# Patient Record
Sex: FEMALE | Race: WHITE | Employment: FULL TIME | URBAN - METROPOLITAN AREA
[De-identification: names, ages, dates, MRNs, and addresses within clinical notes are randomized per-mention and may not be internally consistent; named-entity substitution may affect disease eponyms.]

---

## 2017-09-22 ENCOUNTER — OFFICE VISIT (OUTPATIENT)
Dept: FAMILY MEDICINE CLINIC | Age: 57
End: 2017-09-22

## 2017-09-22 VITALS
DIASTOLIC BLOOD PRESSURE: 68 MMHG | WEIGHT: 116 LBS | HEART RATE: 72 BPM | BODY MASS INDEX: 20.71 KG/M2 | SYSTOLIC BLOOD PRESSURE: 110 MMHG

## 2017-09-22 DIAGNOSIS — E03.9 ACQUIRED HYPOTHYROIDISM: ICD-10-CM

## 2017-09-22 DIAGNOSIS — K21.9 GASTROESOPHAGEAL REFLUX DISEASE WITHOUT ESOPHAGITIS: Primary | ICD-10-CM

## 2017-09-22 DIAGNOSIS — R09.89 GLOBUS PHARYNGEUS: ICD-10-CM

## 2017-09-22 DIAGNOSIS — E78.5 OTHER AND UNSPECIFIED HYPERLIPIDEMIA: ICD-10-CM

## 2017-09-22 DIAGNOSIS — M79.7 FIBROMYALGIA: ICD-10-CM

## 2017-09-22 LAB
CHOLESTEROL, TOTAL: 273 MG/DL (ref 0–199)
GLUCOSE BLD-MCNC: 95 MG/DL (ref 70–99)
HDLC SERPL-MCNC: 85 MG/DL (ref 40–60)
LDL CHOLESTEROL CALCULATED: 169 MG/DL
TRIGL SERPL-MCNC: 97 MG/DL (ref 0–150)
TSH SERPL DL<=0.05 MIU/L-ACNC: 1.31 UIU/ML (ref 0.27–4.2)
VLDLC SERPL CALC-MCNC: 19 MG/DL

## 2017-09-22 PROCEDURE — 99213 OFFICE O/P EST LOW 20 MIN: CPT | Performed by: FAMILY MEDICINE

## 2017-09-22 RX ORDER — LEVOTHYROXINE SODIUM 0.05 MG/1
50 TABLET ORAL DAILY
Qty: 90 TABLET | Refills: 3 | Status: SHIPPED | OUTPATIENT
Start: 2017-09-22 | End: 2018-11-04 | Stop reason: SDUPTHER

## 2017-09-22 RX ORDER — GABAPENTIN 300 MG/1
600 CAPSULE ORAL 3 TIMES DAILY
Qty: 540 CAPSULE | Refills: 3 | Status: SHIPPED | OUTPATIENT
Start: 2017-09-22 | End: 2018-09-21 | Stop reason: SDUPTHER

## 2017-09-22 RX ORDER — CLONAZEPAM 1 MG/1
1 TABLET ORAL DAILY
Qty: 5 TABLET | Refills: 0 | Status: SHIPPED | OUTPATIENT
Start: 2017-09-22 | End: 2020-01-08 | Stop reason: CLARIF

## 2018-01-02 ENCOUNTER — OFFICE VISIT (OUTPATIENT)
Dept: FAMILY MEDICINE CLINIC | Age: 58
End: 2018-01-02

## 2018-01-02 VITALS
HEART RATE: 91 BPM | DIASTOLIC BLOOD PRESSURE: 70 MMHG | BODY MASS INDEX: 21.25 KG/M2 | SYSTOLIC BLOOD PRESSURE: 122 MMHG | WEIGHT: 119 LBS

## 2018-01-02 DIAGNOSIS — Z01.811 PRE-OP CHEST EXAM: Primary | ICD-10-CM

## 2018-01-02 PROCEDURE — 3014F SCREEN MAMMO DOC REV: CPT | Performed by: FAMILY MEDICINE

## 2018-01-02 PROCEDURE — 3017F COLORECTAL CA SCREEN DOC REV: CPT | Performed by: FAMILY MEDICINE

## 2018-01-02 PROCEDURE — G8420 CALC BMI NORM PARAMETERS: HCPCS | Performed by: FAMILY MEDICINE

## 2018-01-02 PROCEDURE — 99213 OFFICE O/P EST LOW 20 MIN: CPT | Performed by: FAMILY MEDICINE

## 2018-01-02 PROCEDURE — 93000 ELECTROCARDIOGRAM COMPLETE: CPT | Performed by: FAMILY MEDICINE

## 2018-01-02 PROCEDURE — G8484 FLU IMMUNIZE NO ADMIN: HCPCS | Performed by: FAMILY MEDICINE

## 2018-01-02 PROCEDURE — G8427 DOCREV CUR MEDS BY ELIG CLIN: HCPCS | Performed by: FAMILY MEDICINE

## 2018-01-02 ASSESSMENT — PATIENT HEALTH QUESTIONNAIRE - PHQ9
1. LITTLE INTEREST OR PLEASURE IN DOING THINGS: 0
SUM OF ALL RESPONSES TO PHQ9 QUESTIONS 1 & 2: 0
SUM OF ALL RESPONSES TO PHQ QUESTIONS 1-9: 0
2. FEELING DOWN, DEPRESSED OR HOPELESS: 0

## 2018-01-02 NOTE — PROGRESS NOTES
Subjective:      Patient ID: Daria Coe is a 65 y.o. female. HPI  66 yo woman slipped and fell 10/16/17 landing on the R shoulder. Pain persisted for 2-3 weeks, sought med attn, MRI demonstrated interstitial split tear of biceps tendon as well as RC tendons with tendonosis. Allergies   Allergen Reactions    Percocet [Oxycodone-Acetaminophen] Shortness Of Breath     Itch and dyspnea    Lyrica [Pregabalin]      paresthesia    Chocolate Swelling and Other (See Comments)     Throat closes    Cymbalta [Duloxetine Hcl]      Mental fogginess    Sulfa Antibiotics Hives     Current Outpatient Prescriptions   Medication Sig Dispense Refill    clonazePAM (KLONOPIN) 1 MG tablet Take 1 tablet by mouth daily Take with supper 5 tablet 0    levothyroxine (SYNTHROID) 50 MCG tablet Take 1 tablet by mouth daily 90 tablet 3    gabapentin (NEURONTIN) 300 MG capsule Take 2 capsules by mouth 3 times daily 540 capsule 3    albuterol (PROVENTIL HFA) 108 (90 BASE) MCG/ACT inhaler Inhale 2 puffs into the lungs every 6 hours as needed for Wheezing. 1 Inhaler 0    beclomethasone (QVAR) 40 MCG/ACT inhaler Inhale 2 puffs into the lungs 2 times daily. 1 Inhaler 3    cetirizine (ZYRTEC) 10 MG tablet Take 10 mg by mouth daily.  montelukast (SINGULAIR) 10 MG tablet Take 10 mg by mouth nightly.  Mirabegron ER (MYRBETRIQ) 25 MG TB24 Take 1 tablet by mouth daily 30 tablet 5    diclofenac-misoprostol (ARTHROTEC 50) 50-0.2 MG per tablet Take 1 tablet by mouth 2 times daily as needed. 60 tablet 3     No current facility-administered medications for this visit.         Immunization History   Administered Date(s) Administered    Tdap (Boostrix, Adacel) 11/28/2016       Past Medical History:   Diagnosis Date    Asthma     Fibromyalgia     Neuropathy of both feet 2013    small fiber, saw Dr. Yandy Rivera at [place] [address] [place] (obstructive sleep apnea)     couldn't tolerate CPAP    Rheumatoid arthritis(714.0) ([facility] [address]     Spinal sounds are normal. She exhibits no abdominal bruit and no mass. There is no hepatosplenomegaly. Musculoskeletal: She exhibits no edema. Right shoulder: She exhibits decreased range of motion. Tenderness: pain with IR, forward flexion. Joints stable and strong   Lymphadenopathy:     She has no cervical adenopathy. Neurological: She is alert and oriented to person, place, and time. She has normal reflexes. No cranial nerve deficit. She displays a negative Romberg sign. Skin: Skin is warm. No rash noted. Psychiatric: She has a normal mood and affect.  Her behavior is normal.     EKG - short [place] interval and TW abnormalities    Addendum as of January 5: Patient had a GXT with [facility] yesterday and there was no evidence of ischemia  Assessment:    rotator cuff tendonosis  Bicipital tendon interstitial tear   OK for surgery   Plan:      Proceed with shoulder surgery

## 2018-01-03 ENCOUNTER — TELEPHONE (OUTPATIENT)
Dept: FAMILY MEDICINE CLINIC | Age: 58
End: 2018-01-03

## 2018-01-03 ENCOUNTER — TELEPHONE (OUTPATIENT)
Dept: CARDIOLOGY CLINIC | Age: 58
End: 2018-01-03

## 2018-01-03 DIAGNOSIS — R94.31 EKG ABNORMALITIES: Primary | ICD-10-CM

## 2018-01-04 NOTE — TELEPHONE ENCOUNTER
Pt informed. I called [facility] and scheduled Myoview for tomorrow at 10am.   Pt informed of test date. Advised pt that we will call her with results when we get them.

## 2018-01-04 NOTE — TELEPHONE ENCOUNTER
Dr. Yonatan Sykes did interpret pt EKG and is in media tab. Called Dr. [place] office and spoke to [facility] and notified her per Dr. Cherry Chaves recommendation for pt to get a Nuclear stress test, she verbalized understanding.

## 2018-01-04 NOTE — TELEPHONE ENCOUNTER
Jamison Olvera with Dr. Christina Finely office calling, Dr. Sania Hung interpreted the EKG and is suggesting nuclear stress test.

## 2018-01-05 ENCOUNTER — HOSPITAL ENCOUNTER (OUTPATIENT)
Dept: NON INVASIVE DIAGNOSTICS | Age: 58
Discharge: HOME | End: 2018-01-05
Attending: FAMILY MEDICINE | Admitting: FAMILY MEDICINE

## 2018-01-05 DIAGNOSIS — R94.31 ABNORMAL ELECTROCARDIOGRAM: ICD-10-CM

## 2018-01-05 DIAGNOSIS — R94.31 ABNORMAL EKG: ICD-10-CM

## 2018-01-05 LAB
LV EF: 77 %
LVEF MODALITY: NORMAL

## 2018-01-05 RX ORDER — 0.9 % SODIUM CHLORIDE 0.9 %
10 VIAL (ML) INJECTION PRN
Status: DISCONTINUED | OUTPATIENT
Start: 2018-01-05 | End: 2018-01-06 | Stop reason: HOSPADM

## 2018-01-05 RX ADMIN — Medication 10 ML: at 11:49

## 2018-01-05 RX ADMIN — Medication 10 ML: at 09:57

## 2018-09-22 RX ORDER — GABAPENTIN 300 MG/1
600 CAPSULE ORAL 3 TIMES DAILY
Qty: 540 CAPSULE | Refills: 1 | Status: SHIPPED | OUTPATIENT
Start: 2018-09-22 | End: 2019-01-11 | Stop reason: SDUPTHER

## 2018-11-04 DIAGNOSIS — E03.9 ACQUIRED HYPOTHYROIDISM: Primary | ICD-10-CM

## 2018-11-05 RX ORDER — LEVOTHYROXINE SODIUM 0.05 MG/1
50 TABLET ORAL DAILY
Qty: 90 TABLET | Refills: 0 | Status: SHIPPED | OUTPATIENT
Start: 2018-11-05 | End: 2019-03-05 | Stop reason: SDUPTHER

## 2018-11-15 RX ORDER — LEVOTHYROXINE SODIUM 0.05 MG/1
50 TABLET ORAL DAILY
Qty: 90 TABLET | OUTPATIENT
Start: 2018-11-15

## 2019-01-11 ENCOUNTER — OFFICE VISIT (OUTPATIENT)
Dept: FAMILY MEDICINE CLINIC | Age: 59
End: 2019-01-11
Payer: COMMERCIAL

## 2019-01-11 VITALS
WEIGHT: 119 LBS | RESPIRATION RATE: 15 BRPM | SYSTOLIC BLOOD PRESSURE: 118 MMHG | BODY MASS INDEX: 21.25 KG/M2 | HEART RATE: 83 BPM | DIASTOLIC BLOOD PRESSURE: 80 MMHG | OXYGEN SATURATION: 97 %

## 2019-01-11 DIAGNOSIS — E03.9 ACQUIRED HYPOTHYROIDISM: Primary | ICD-10-CM

## 2019-01-11 DIAGNOSIS — E03.9 ACQUIRED HYPOTHYROIDISM: ICD-10-CM

## 2019-01-11 DIAGNOSIS — G62.89 OTHER POLYNEUROPATHY: ICD-10-CM

## 2019-01-11 DIAGNOSIS — M79.7 FIBROMYALGIA: ICD-10-CM

## 2019-01-11 DIAGNOSIS — E78.00 HYPERCHOLESTEREMIA: ICD-10-CM

## 2019-01-11 LAB
CHOLESTEROL, FASTING: 238 MG/DL (ref 0–199)
GLUCOSE FASTING: 90 MG/DL (ref 70–99)
HDLC SERPL-MCNC: 78 MG/DL (ref 40–60)
LDL CHOLESTEROL CALCULATED: 145 MG/DL
THYROID PEROXIDASE (TPO) ABS: 11 IU/ML
TRIGLYCERIDE, FASTING: 74 MG/DL (ref 0–150)
TSH SERPL DL<=0.05 MIU/L-ACNC: 0.58 UIU/ML (ref 0.27–4.2)
VLDLC SERPL CALC-MCNC: 15 MG/DL

## 2019-01-11 PROCEDURE — 99214 OFFICE O/P EST MOD 30 MIN: CPT | Performed by: FAMILY MEDICINE

## 2019-01-11 PROCEDURE — 36415 COLL VENOUS BLD VENIPUNCTURE: CPT | Performed by: FAMILY MEDICINE

## 2019-01-11 RX ORDER — GABAPENTIN 300 MG/1
600 CAPSULE ORAL 3 TIMES DAILY
Qty: 540 CAPSULE | Refills: 3 | Status: SHIPPED | OUTPATIENT
Start: 2019-01-11 | End: 2020-01-08 | Stop reason: SDUPTHER

## 2019-01-11 ASSESSMENT — PATIENT HEALTH QUESTIONNAIRE - PHQ9
SUM OF ALL RESPONSES TO PHQ QUESTIONS 1-9: 0
1. LITTLE INTEREST OR PLEASURE IN DOING THINGS: 0
SUM OF ALL RESPONSES TO PHQ9 QUESTIONS 1 & 2: 0
SUM OF ALL RESPONSES TO PHQ QUESTIONS 1-9: 0
2. FEELING DOWN, DEPRESSED OR HOPELESS: 0

## 2019-12-19 ENCOUNTER — TELEPHONE (OUTPATIENT)
Dept: PRIMARY CARE CLINIC | Age: 59
End: 2019-12-19

## 2019-12-19 DIAGNOSIS — E03.9 ACQUIRED HYPOTHYROIDISM: ICD-10-CM

## 2019-12-19 DIAGNOSIS — E78.00 HYPERCHOLESTEREMIA: Primary | ICD-10-CM

## 2020-01-06 DIAGNOSIS — E78.00 HYPERCHOLESTEREMIA: ICD-10-CM

## 2020-01-06 DIAGNOSIS — E03.9 ACQUIRED HYPOTHYROIDISM: ICD-10-CM

## 2020-01-06 LAB
CHOLESTEROL, TOTAL: 252 MG/DL (ref 0–199)
HDLC SERPL-MCNC: 66 MG/DL (ref 40–60)
LDL CHOLESTEROL CALCULATED: 167 MG/DL
T4 FREE: 1.4 NG/DL (ref 0.9–1.8)
TRIGL SERPL-MCNC: 95 MG/DL (ref 0–150)
TSH SERPL DL<=0.05 MIU/L-ACNC: 1.41 UIU/ML (ref 0.27–4.2)
VLDLC SERPL CALC-MCNC: 19 MG/DL

## 2020-01-08 ENCOUNTER — OFFICE VISIT (OUTPATIENT)
Dept: PRIMARY CARE CLINIC | Age: 60
End: 2020-01-08
Payer: COMMERCIAL

## 2020-01-08 VITALS
RESPIRATION RATE: 16 BRPM | WEIGHT: 121 LBS | SYSTOLIC BLOOD PRESSURE: 102 MMHG | TEMPERATURE: 98.1 F | BODY MASS INDEX: 21.44 KG/M2 | OXYGEN SATURATION: 99 % | HEIGHT: 63 IN | DIASTOLIC BLOOD PRESSURE: 68 MMHG | HEART RATE: 88 BPM

## 2020-01-08 LAB — S PYO AG THROAT QL: NORMAL

## 2020-01-08 PROCEDURE — 87880 STREP A ASSAY W/OPTIC: CPT | Performed by: FAMILY MEDICINE

## 2020-01-08 PROCEDURE — 99396 PREV VISIT EST AGE 40-64: CPT | Performed by: FAMILY MEDICINE

## 2020-01-08 RX ORDER — LEVOTHYROXINE SODIUM 0.05 MG/1
50 TABLET ORAL DAILY
Qty: 90 TABLET | Refills: 3 | Status: SHIPPED | OUTPATIENT
Start: 2020-01-08 | End: 2021-01-05

## 2020-01-08 RX ORDER — GABAPENTIN 300 MG/1
600 CAPSULE ORAL 3 TIMES DAILY
Qty: 540 CAPSULE | Refills: 3 | Status: SHIPPED | OUTPATIENT
Start: 2020-01-08 | End: 2021-01-05

## 2020-01-08 ASSESSMENT — PATIENT HEALTH QUESTIONNAIRE - PHQ9
2. FEELING DOWN, DEPRESSED OR HOPELESS: 0
SUM OF ALL RESPONSES TO PHQ QUESTIONS 1-9: 0
SUM OF ALL RESPONSES TO PHQ QUESTIONS 1-9: 0
SUM OF ALL RESPONSES TO PHQ9 QUESTIONS 1 & 2: 0
1. LITTLE INTEREST OR PLEASURE IN DOING THINGS: 0

## 2020-01-08 ASSESSMENT — ENCOUNTER SYMPTOMS: ABDOMINAL PAIN: 1

## 2020-01-09 NOTE — PROGRESS NOTES
needed. 60 tablet 3     No current facility-administered medications for this visit. Immunization History   Administered Date(s) Administered    Tdap (Boostrix, Adacel) 11/28/2016       Past Medical History:   Diagnosis Date    Asthma     Fibromyalgia     Neuropathy of both feet 2013    small fiber, saw Dr. Sloan Andersen at [facility] [address] [place] (obstructive sleep apnea)     couldn't tolerate CPAP    Rheumatoid arthritis(714.0)     Spinal stenosis, lumbar     PATTY twice in 2011     Past Surgical History:   Procedure Laterality Date    BREAST BIOPSY Left     HYSTERECTOMY, VAGINAL  1988    bladder repair too    NASAL SINUS SURGERY Bilateral 01/2016    blloon plasty    SHOULDER SURGERY Right Jan '18    biceps tendon repair, removed end of clavicle     Family History   Problem Relation Age of Onset    High Cholesterol Mother     Cancer Mother         breast           Objective:   Physical Exam  Constitutional:       Appearance: She is well-developed. HENT:      Head: Normocephalic. Right Ear: Tympanic membrane, ear canal and external ear normal.      Left Ear: Tympanic membrane, ear canal and external ear normal.      Nose: Nose normal. No mucosal edema. Mouth/Throat:      Dentition: Normal dentition. Pharynx: Uvula midline. Pharyngeal swelling and posterior oropharyngeal erythema present. No oropharyngeal exudate. Eyes:      Conjunctiva/sclera: Conjunctivae normal.      Pupils: Pupils are equal, round, and reactive to light. Neck:      Musculoskeletal: Normal range of motion and neck supple. Thyroid: No thyromegaly. Vascular: No carotid bruit. Cardiovascular:      Rate and Rhythm: Normal rate and regular rhythm. Heart sounds: Normal heart sounds. No murmur. Pulmonary:      Effort: Pulmonary effort is normal.      Breath sounds: Normal breath sounds. Abdominal:      General: Bowel sounds are normal. There is no abdominal bruit. Palpations: Abdomen is soft. There is no mass. Tenderness: There is tenderness in the right upper quadrant. Negative signs include Pederson's sign. Musculoskeletal: Normal range of motion. Cervical back: She exhibits tenderness (paired trigger pts in neck mm). Comments: Joints stable and strong   Lymphadenopathy:      Cervical: Cervical adenopathy present. Right cervical: Superficial cervical adenopathy present. Skin:     General: Skin is warm. Findings: No rash. Neurological:      Mental Status: She is alert and oriented to person, place, and time. Cranial Nerves: No cranial nerve deficit. Deep Tendon Reflexes: Reflexes are normal and symmetric.    Psychiatric:         Behavior: Behavior normal.       Vitals:    01/08/20 1906   BP: 102/68   Pulse: 88   Resp: 16   Temp: 98.1 °F (36.7 °C)   SpO2: 99%     Rapid strep negative    Assessment:    Well woman  Viral pharyngitis  Right upper quadrant pain possible gallbladder disease  Fibromyalgia  Bilateral knee pain of uncertain cause rule out autoimmune      Plan:    Lab work  Refill Synthroid and gabapentin          Kirsten Ayala MD

## 2020-01-30 DIAGNOSIS — E55.9 VITAMIN D DEFICIENCY: ICD-10-CM

## 2020-01-30 DIAGNOSIS — M79.18 MYALGIA OF MUSCLE OF NECK: ICD-10-CM

## 2020-01-30 DIAGNOSIS — M17.0 ARTHRITIS OF BOTH KNEES: ICD-10-CM

## 2020-01-30 LAB
BASOPHILS ABSOLUTE: 0.1 K/UL (ref 0–0.2)
BASOPHILS RELATIVE PERCENT: 1.3 %
C-REACTIVE PROTEIN: 0.8 MG/L (ref 0–5.1)
EOSINOPHILS ABSOLUTE: 0.2 K/UL (ref 0–0.6)
EOSINOPHILS RELATIVE PERCENT: 4.2 %
HCT VFR BLD CALC: 43.7 % (ref 36–48)
HEMOGLOBIN: 15.2 G/DL (ref 12–16)
LYMPHOCYTES ABSOLUTE: 1.7 K/UL (ref 1–5.1)
LYMPHOCYTES RELATIVE PERCENT: 28.5 %
MCH RBC QN AUTO: 32.4 PG (ref 26–34)
MCHC RBC AUTO-ENTMCNC: 34.7 G/DL (ref 31–36)
MCV RBC AUTO: 93.5 FL (ref 80–100)
MONOCYTES ABSOLUTE: 0.4 K/UL (ref 0–1.3)
MONOCYTES RELATIVE PERCENT: 6.1 %
NEUTROPHILS ABSOLUTE: 3.5 K/UL (ref 1.7–7.7)
NEUTROPHILS RELATIVE PERCENT: 59.9 %
PDW BLD-RTO: 13.1 % (ref 12.4–15.4)
PLATELET # BLD: 237 K/UL (ref 135–450)
PMV BLD AUTO: 8.8 FL (ref 5–10.5)
RBC # BLD: 4.68 M/UL (ref 4–5.2)
SEDIMENTATION RATE, ERYTHROCYTE: 7 MM/HR (ref 0–30)
URIC ACID, SERUM: 3.4 MG/DL (ref 2.6–6)
VITAMIN D 25-HYDROXY: 32.3 NG/ML
WBC # BLD: 5.8 K/UL (ref 4–11)

## 2020-01-31 LAB — ANTI-NUCLEAR ANTIBODY (ANA): NEGATIVE

## 2021-01-03 DIAGNOSIS — E03.9 ACQUIRED HYPOTHYROIDISM: Primary | ICD-10-CM

## 2021-01-04 NOTE — TELEPHONE ENCOUNTER
Medication:   Requested Prescriptions     Pending Prescriptions Disp Refills    levothyroxine (SYNTHROID) 50 MCG tablet [Pharmacy Med Name: LEVOTHYROXINE  50MCG  TAB] 90 tablet 3     Sig: TAKE 1 TABLET BY MOUTH  DAILY    gabapentin (NEURONTIN) 300 MG capsule [Pharmacy Med Name: GABAPENTIN 300MG CAPSULE] 540 capsule 3     Sig: TAKE 2 CAPSULES BY MOUTH 3  TIMES DAILY     Last Filled: 1.8.20   Last appt: 1/8/2020   Next appt: Visit date not found    Last Thyroid:   Lab Results   Component Value Date    TSH 1.41 01/06/2020    T4FREE 1.4 01/06/2020

## 2021-01-05 RX ORDER — GABAPENTIN 300 MG/1
CAPSULE ORAL
Qty: 540 CAPSULE | Refills: 0 | Status: SHIPPED | OUTPATIENT
Start: 2021-01-05 | End: 2021-03-27

## 2021-01-05 RX ORDER — LEVOTHYROXINE SODIUM 0.05 MG/1
50 TABLET ORAL DAILY
Qty: 90 TABLET | Refills: 0 | Status: SHIPPED | OUTPATIENT
Start: 2021-01-05 | End: 2021-03-27

## 2021-01-12 DIAGNOSIS — E03.9 ACQUIRED HYPOTHYROIDISM: Primary | ICD-10-CM

## 2021-01-12 DIAGNOSIS — E78.00 HYPERCHOLESTEREMIA: ICD-10-CM

## 2021-01-19 ENCOUNTER — OFFICE VISIT (OUTPATIENT)
Dept: PRIMARY CARE CLINIC | Age: 61
End: 2021-01-19
Payer: COMMERCIAL

## 2021-01-19 VITALS
DIASTOLIC BLOOD PRESSURE: 66 MMHG | SYSTOLIC BLOOD PRESSURE: 115 MMHG | HEART RATE: 70 BPM | WEIGHT: 123 LBS | TEMPERATURE: 97.8 F | BODY MASS INDEX: 21.96 KG/M2

## 2021-01-19 DIAGNOSIS — S05.91XA RIGHT EYE INJURY, INITIAL ENCOUNTER: Primary | ICD-10-CM

## 2021-01-19 DIAGNOSIS — R68.84 MANDIBULAR PAIN: ICD-10-CM

## 2021-01-19 DIAGNOSIS — E78.00 HYPERCHOLESTEREMIA: ICD-10-CM

## 2021-01-19 DIAGNOSIS — E03.9 ACQUIRED HYPOTHYROIDISM: ICD-10-CM

## 2021-01-19 LAB
CHOLESTEROL, TOTAL: 252 MG/DL (ref 0–199)
HDLC SERPL-MCNC: 77 MG/DL (ref 40–60)
LDL CHOLESTEROL CALCULATED: 161 MG/DL
TRIGL SERPL-MCNC: 72 MG/DL (ref 0–150)
TSH SERPL DL<=0.05 MIU/L-ACNC: 0.86 UIU/ML (ref 0.27–4.2)
VLDLC SERPL CALC-MCNC: 14 MG/DL

## 2021-01-19 PROCEDURE — 99213 OFFICE O/P EST LOW 20 MIN: CPT | Performed by: FAMILY MEDICINE

## 2021-01-19 SDOH — ECONOMIC STABILITY: TRANSPORTATION INSECURITY
IN THE PAST 12 MONTHS, HAS LACK OF TRANSPORTATION KEPT YOU FROM MEETINGS, WORK, OR FROM GETTING THINGS NEEDED FOR DAILY LIVING?: NOT ASKED

## 2021-01-19 SDOH — ECONOMIC STABILITY: INCOME INSECURITY: HOW HARD IS IT FOR YOU TO PAY FOR THE VERY BASICS LIKE FOOD, HOUSING, MEDICAL CARE, AND HEATING?: NOT HARD AT ALL

## 2021-01-19 ASSESSMENT — PATIENT HEALTH QUESTIONNAIRE - PHQ9: 1. LITTLE INTEREST OR PLEASURE IN DOING THINGS: 0

## 2021-01-26 ENCOUNTER — TELEPHONE (OUTPATIENT)
Dept: PRIMARY CARE CLINIC | Age: 61
End: 2021-01-26

## 2021-01-26 NOTE — TELEPHONE ENCOUNTER
Pt called back and stated that her eye is doing better and pt doesn't need a call back. It was an abrasion and pt is doing a lot better with medicine that RAJESH gave her.

## 2021-03-26 NOTE — TELEPHONE ENCOUNTER
Medication:   Requested Prescriptions     Pending Prescriptions Disp Refills    gabapentin (NEURONTIN) 300 MG capsule [Pharmacy Med Name: GABAPENTIN 300MG CAPSULE] 540 capsule 3     Sig: TAKE 2 CAPSULES BY MOUTH 3  TIMES DAILY    levothyroxine (SYNTHROID) 50 MCG tablet [Pharmacy Med Name: LEVOTHYROXINE  50MCG  TAB] 90 tablet 3     Sig: TAKE 1 TABLET BY MOUTH  DAILY       Last Filled:      Patient Phone Number: [phone] (home)     Last appt: 1/19/2021   Next appt: Visit date not found    Last Thyroid:   Lab Results   Component Value Date    TSH 0.86 01/19/2021    T4FREE 1.4 01/06/2020       Last OARRS: No flowsheet data found. Preferred Pharmacy:   [address] [facility] [phone] - F [phone]  [place] [address]  [address]  [facility] [address]  Phone: [phone] Fax: [address] [facility] [address] [address] [facility]  [address]  [facility] [address] [facility]  Phone: [phone] Fax: [phone]    [facility] [address] [place] [address] [phone] [place] [phone]  [facility] [address]  Phone: [phone] Fax: [phone]

## 2021-03-27 RX ORDER — LEVOTHYROXINE SODIUM 0.05 MG/1
50 TABLET ORAL DAILY
Qty: 90 TABLET | Refills: 3 | Status: SHIPPED | OUTPATIENT
Start: 2021-03-27 | End: 2022-02-23

## 2021-03-27 RX ORDER — GABAPENTIN 300 MG/1
CAPSULE ORAL
Qty: 540 CAPSULE | Refills: 3 | Status: SHIPPED | OUTPATIENT
Start: 2021-03-27 | End: 2022-02-23

## 2021-10-18 ENCOUNTER — NURSE ONLY (OUTPATIENT)
Dept: PRIMARY CARE CLINIC | Age: 61
End: 2021-10-18

## 2021-10-18 ENCOUNTER — OFFICE VISIT (OUTPATIENT)
Dept: PRIMARY CARE CLINIC | Age: 61
End: 2021-10-18
Payer: COMMERCIAL

## 2021-10-18 VITALS
TEMPERATURE: 98.4 F | WEIGHT: 121.4 LBS | DIASTOLIC BLOOD PRESSURE: 68 MMHG | OXYGEN SATURATION: 98 % | SYSTOLIC BLOOD PRESSURE: 116 MMHG | BODY MASS INDEX: 21.51 KG/M2 | HEART RATE: 83 BPM | HEIGHT: 63 IN

## 2021-10-18 DIAGNOSIS — R94.31 ABNORMAL EKG: ICD-10-CM

## 2021-10-18 DIAGNOSIS — Z01.818 PREOP EXAMINATION: Primary | ICD-10-CM

## 2021-10-18 DIAGNOSIS — Z01.818 PREOP EXAMINATION: ICD-10-CM

## 2021-10-18 LAB
APTT: 36.1 SEC (ref 26.2–38.6)
INR BLD: 0.97 (ref 0.88–1.12)
PROTHROMBIN TIME: 10.9 SEC (ref 9.9–12.7)

## 2021-10-18 PROCEDURE — 93000 ELECTROCARDIOGRAM COMPLETE: CPT | Performed by: STUDENT IN AN ORGANIZED HEALTH CARE EDUCATION/TRAINING PROGRAM

## 2021-10-18 PROCEDURE — 99212 OFFICE O/P EST SF 10 MIN: CPT | Performed by: STUDENT IN AN ORGANIZED HEALTH CARE EDUCATION/TRAINING PROGRAM

## 2021-10-18 NOTE — PATIENT INSTRUCTIONS
Gregory LIMA[address] [facility]    COVID preop testing   .   Call Please call [phone] to schedule stress test

## 2021-10-18 NOTE — PROGRESS NOTES
Preoperative Consultation      Gail Blank  YOB: 1960    Date of Service:  10/18/2021    Vitals:    10/18/21 1449   BP: 116/68   Site: Right Upper Arm   Position: Sitting   Cuff Size: Medium Adult   Pulse: 83   Temp: 98.4 °F (36.9 °C)   TempSrc: Oral   SpO2: 98%   Weight: 121 lb 6.4 oz (55.1 kg)   Height: 5' 2.75\" (1.594 m)      Wt Readings from Last 2 Encounters:   10/18/21 121 lb 6.4 oz (55.1 kg)   01/19/21 123 lb (55.8 kg)     BP Readings from Last 3 Encounters:   10/18/21 116/68   01/19/21 115/66   01/08/20 102/68        Chief Complaint   Patient presents with    Pre-op Exam     11.4.21, Dr. Parminder Weaver, ph. [phone] f- [phone], [facility]     Allergies   Allergen Reactions    Percocet [Oxycodone-Acetaminophen] Shortness Of Breath     Itch and dyspnea    Lyrica [Pregabalin]      paresthesia    Chocolate Swelling and Other (See Comments)     Throat closes    Cymbalta [Duloxetine Hcl]      Mental fogginess    Sulfa Antibiotics Hives     Outpatient Medications Marked as Taking for the 10/18/21 encounter (Office Visit) with Seema Samuels MD   Medication Sig Dispense Refill    levothyroxine (SYNTHROID) 50 MCG tablet TAKE 1 TABLET BY MOUTH  DAILY 90 tablet 3    albuterol (PROVENTIL HFA) 108 (90 BASE) MCG/ACT inhaler Inhale 2 puffs into the lungs every 6 hours as needed for Wheezing. 1 Inhaler 0    beclomethasone (QVAR) 40 MCG/ACT inhaler Inhale 2 puffs into the lungs 2 times daily. 1 Inhaler 3    cetirizine (ZYRTEC) 10 MG tablet Take 10 mg by mouth daily.  montelukast (SINGULAIR) 10 MG tablet Take 10 mg by mouth nightly.          Cloyce Copping  Well controlled  Not shortness of breath    Hypothyroid  Stable  Asymptomatic    [place]  Does not wear CPAP, denies SHORTNESS OF BREATH, palpitations, chest pain or fatigue  This patient presents to the office today for a preoperative consultation at the request of surgeon, Dr. Maile Donis, who plans on performing L5-S1 anterior/posterior lumbar fusion on November 4 at [facility].  The current problem began 7 months ago, and symptoms have been unchanged with time. Conservative therapy: physical therapy, pain meds, and epidurals with no relief in sx's .     Planned anesthesia: General   Known anesthesia problems: None   Bleeding risk: No recent or remote history of abnormal bleeding  Personal or FH of DVT/PE: No    Patient objection to receiving blood products: No    Patient Active Problem List   Diagnosis    Hypothyroid    Fibromyalgia    Irritable bowel syndrome    Biceps tendonitis    Lumbar radicular pain    Asthma    Hypercholesteremia    [place] (obstructive sleep apnea)       Past Medical History:   Diagnosis Date    Asthma     Fibromyalgia     Neuropathy of both feet 2013    small fiber, saw Dr. Amarjit Canales at [facility] [address] [place] (obstructive sleep apnea)     couldn't tolerate CPAP    Rheumatoid arthritis(714.0)     Spinal stenosis, lumbar     PATTY twice in 2011     Past Surgical History:   Procedure Laterality Date    BREAST BIOPSY Left     HYSTERECTOMY, VAGINAL  1988    bladder repair too    NASAL SINUS SURGERY Bilateral 01/2016    blloon plasty    SHOULDER SURGERY Right Jan '18    biceps tendon repair, removed end of clavicle     Family History   Problem Relation Age of Onset    High Cholesterol Mother     Cancer Mother         breast     Social History     Socioeconomic History    Marital status:      Spouse name: Spring Hearn Number of children: 3    Years of education: Not on file    Highest education level: Not on file   Occupational History    Occupation: RN     Comment: [employer]   Tobacco Use    Smoking status: Never Smoker    Smokeless tobacco: Never Used   Substance and Sexual Activity    Alcohol use: No     Comment: very rare    Drug use: No    Sexual activity: Yes     Partners: Male   Other Topics Concern    Not on file   Social History Narrative    2 grandchildren    Leslie and stairs for exercise     Social Determinants of Health     Financial Resource Strain: Low Risk     Difficulty of Paying Living Expenses: Not hard at all   Food Insecurity: No Food Insecurity    Worried About Running Out of Food in the Last Year: Never true    Terra of Food in the Last Year: Never true   Transportation Needs:     Lack of Transportation (Medical):  Lack of Transportation (Non-Medical):    Physical Activity:     Days of Exercise per Week:     Minutes of Exercise per Session:    Stress:     Feeling of Stress :    Social Connections:     Frequency of Communication with Friends and Family:     Frequency of Social Gatherings with Friends and Family:     Attends Restoration Services:     Active Member of Clubs or Organizations:     Attends Club or Organization Meetings:     Marital Status:    Intimate Partner Violence:     Fear of Current or Ex-Partner:     Emotionally Abused:     Physically Abused:     Sexually Abused:        Review of Systems  A comprehensive review of systems was negative except for what was noted in the HPI. Physical Exam   Constitutional: She is oriented to person, place, and time. She appears well-developed and well-nourished. No distress. HENT:   Head: Normocephalic and atraumatic. Mouth/Throat: Uvula is midline, oropharynx is clear and moist and mucous membranes are normal.   Eyes: Conjunctivae and EOM are normal. Pupils are equal, round, and reactive to light. Neck: Trachea normal and normal range of motion. Neck supple. No JVD present. Carotid bruit is not present. No mass and no thyromegaly present. Cardiovascular: Normal rate, regular rhythm, normal heart sounds and intact distal pulses. Exam reveals no gallop and no friction rub. No murmur heard. Pulmonary/Chest: Effort normal and breath sounds normal. No respiratory distress. She has no wheezes. She has no rales. Abdominal: Soft. Normal aorta and bowel sounds are normal. She exhibits no distension and no mass.  There is no hepatosplenomegaly. No tenderness. Musculoskeletal: She exhibits no edema and no tenderness. Neurological: She is alert and oriented to person, place, and time. She has normal strength. No cranial nerve deficit or sensory deficit. Coordination and gait normal.   Skin: Skin is warm and dry. No rash noted. No erythema. Psychiatric: She has a normal mood and affect. Her behavior is normal.     EKG Interpretation:  ST depression in V2-V4 change from EKG in 2018    Lab Review not applicable        Assessment:       62 y.o. patient with planned surgery as above. Known risk factors for perioperative complications: Asthma  Current medications which may produce withdrawal symptoms if withheld perioperatively: none      Plan:     1. Preoperative workup as follows: cardiac stress testing to exclude undiagnosed coronary disease (EKG today showed inverted T waves in leads V2, V3, V4, V5 which is a change from EKG done back in 2018) patient is asymptomatic. 2. Change in medication regimen before surgery: Discontinue ASA 7 days before surgery, Discontinue NSAIDs (motrin/aleve) 7 days before surgery  3. Prophylaxis for cardiac events with perioperative beta-blockers: Not indicated  ACC/AHA indications for pre-operative beta-blocker use:    · Vascular surgery with history of postitive stress test  · Intermediate or high risk surgery with history of CAD   · Intermediate or high risk surgery with multiple clinical predictors of CAD- 2 of the following: history of compensated or prior heart failure, history of cerebrovascular disease, DM, or renal insufficiency    Routine administration of higher-dose, long-acting metoprolol in beta-blocker-naïve patients on the day of surgery, and in the absence of dose titration is associated with an overall increase in mortality.   Beta-blockers should be started days to weeks prior to surgery and titrated to pulse < 70.  4. Deep vein thrombosis prophylaxis: regimen to be chosen by surgical team  5. Will await stress testing results before can clear for surgery.     Update on 10/26/21, stress test normal, OK FOR SURGERY

## 2021-10-19 LAB
ANION GAP SERPL CALCULATED.3IONS-SCNC: 14 MMOL/L (ref 3–16)
BASOPHILS ABSOLUTE: 0.1 K/UL (ref 0–0.2)
BASOPHILS RELATIVE PERCENT: 0.9 %
BILIRUBIN URINE: NEGATIVE
BLOOD, URINE: NEGATIVE
BUN BLDV-MCNC: 18 MG/DL (ref 7–20)
CALCIUM SERPL-MCNC: 10 MG/DL (ref 8.3–10.6)
CHLORIDE BLD-SCNC: 102 MMOL/L (ref 99–110)
CLARITY: CLEAR
CO2: 26 MMOL/L (ref 21–32)
COLOR: YELLOW
CREAT SERPL-MCNC: 0.9 MG/DL (ref 0.6–1.2)
EOSINOPHILS ABSOLUTE: 0.1 K/UL (ref 0–0.6)
EOSINOPHILS RELATIVE PERCENT: 1.2 %
GFR AFRICAN AMERICAN: >60
GFR NON-AFRICAN AMERICAN: >60
GLUCOSE BLD-MCNC: 97 MG/DL (ref 70–99)
GLUCOSE URINE: NEGATIVE MG/DL
HCT VFR BLD CALC: 45.4 % (ref 36–48)
HEMOGLOBIN: 15.8 G/DL (ref 12–16)
KETONES, URINE: NEGATIVE MG/DL
LEUKOCYTE ESTERASE, URINE: NEGATIVE
LYMPHOCYTES ABSOLUTE: 1.8 K/UL (ref 1–5.1)
LYMPHOCYTES RELATIVE PERCENT: 21 %
MCH RBC QN AUTO: 32.6 PG (ref 26–34)
MCHC RBC AUTO-ENTMCNC: 34.9 G/DL (ref 31–36)
MCV RBC AUTO: 93.4 FL (ref 80–100)
MICROSCOPIC EXAMINATION: NORMAL
MONOCYTES ABSOLUTE: 0.6 K/UL (ref 0–1.3)
MONOCYTES RELATIVE PERCENT: 6.8 %
NEUTROPHILS ABSOLUTE: 5.9 K/UL (ref 1.7–7.7)
NEUTROPHILS RELATIVE PERCENT: 70.1 %
NITRITE, URINE: NEGATIVE
PDW BLD-RTO: 12.8 % (ref 12.4–15.4)
PH UA: 6.5 (ref 5–8)
PLATELET # BLD: 266 K/UL (ref 135–450)
PMV BLD AUTO: 9 FL (ref 5–10.5)
POTASSIUM SERPL-SCNC: 4.2 MMOL/L (ref 3.5–5.1)
PROTEIN UA: NEGATIVE MG/DL
RBC # BLD: 4.86 M/UL (ref 4–5.2)
SODIUM BLD-SCNC: 142 MMOL/L (ref 136–145)
SPECIFIC GRAVITY UA: 1.01 (ref 1–1.03)
URINE CULTURE, ROUTINE: NORMAL
URINE TYPE: NORMAL
UROBILINOGEN, URINE: 0.2 E.U./DL
WBC # BLD: 8.4 K/UL (ref 4–11)

## 2021-10-20 LAB — MRSA CULTURE ONLY: NORMAL

## 2021-10-25 ENCOUNTER — HOSPITAL ENCOUNTER (OUTPATIENT)
Dept: NON INVASIVE DIAGNOSTICS | Age: 61
Discharge: HOME | End: 2021-10-25
Payer: COMMERCIAL

## 2021-10-25 DIAGNOSIS — Z01.818 PREOP EXAMINATION: ICD-10-CM

## 2021-10-25 DIAGNOSIS — R94.31 ABNORMAL EKG: ICD-10-CM

## 2021-10-25 LAB
LV EF: 79 %
LVEF MODALITY: NORMAL

## 2021-10-25 PROCEDURE — 93017 CV STRESS TEST TRACING ONLY: CPT

## 2021-10-25 PROCEDURE — 6360000002 HC RX W HCPCS: Performed by: INTERNAL MEDICINE

## 2021-10-25 PROCEDURE — 3430000000 HC RX DIAGNOSTIC RADIOPHARMACEUTICAL: Performed by: STUDENT IN AN ORGANIZED HEALTH CARE EDUCATION/TRAINING PROGRAM

## 2021-10-25 PROCEDURE — A9502 TC99M TETROFOSMIN: HCPCS | Performed by: STUDENT IN AN ORGANIZED HEALTH CARE EDUCATION/TRAINING PROGRAM

## 2021-10-25 PROCEDURE — 2580000003 HC RX 258: Performed by: STUDENT IN AN ORGANIZED HEALTH CARE EDUCATION/TRAINING PROGRAM

## 2021-10-25 PROCEDURE — 78452 HT MUSCLE IMAGE SPECT MULT: CPT

## 2021-10-25 RX ORDER — SODIUM CHLORIDE 0.9 % (FLUSH) 0.9 %
10 SYRINGE (ML) INJECTION PRN
Status: DISCONTINUED | OUTPATIENT
Start: 2021-10-25 | End: 2021-10-26 | Stop reason: HOSPADM

## 2021-10-25 RX ADMIN — TETROFOSMIN 30 MILLICURIE: 1.38 INJECTION, POWDER, LYOPHILIZED, FOR SOLUTION INTRAVENOUS at 09:34

## 2021-10-25 RX ADMIN — REGADENOSON 0.4 MG: 0.08 INJECTION, SOLUTION INTRAVENOUS at 09:34

## 2021-10-25 RX ADMIN — Medication 10 ML: at 09:34

## 2021-10-25 RX ADMIN — TETROFOSMIN 10 MILLICURIE: 1.38 INJECTION, POWDER, LYOPHILIZED, FOR SOLUTION INTRAVENOUS at 08:22

## 2021-10-25 RX ADMIN — Medication 10 ML: at 08:23

## 2021-10-25 NOTE — PROGRESS NOTES
Patient was scheduled for a treadmill Myoview stress test today. Per Cardiologist, Dr. Ana Saavedra, it is ok to switch patient's test to a chemical Lexiscan stress test due to patient's back pain and scheduled lumbar surgery. Order placed for [place].

## 2021-10-26 ENCOUNTER — TELEPHONE (OUTPATIENT)
Dept: PRIMARY CARE CLINIC | Age: 61
End: 2021-10-26

## 2021-10-26 NOTE — TELEPHONE ENCOUNTER
Patient was calling because she received her results for her stress test on her MyChart but they have not been reviewed yet by Dr. Denis Cobb. Patient was having surgery next week and Dr Denis Cobb will be out of the office this Thursday and Friday.       Patient wanted to see if the results can be read before Thursday

## 2021-10-27 ENCOUNTER — TELEPHONE (OUTPATIENT)
Dept: PRIMARY CARE CLINIC | Age: 61
End: 2021-10-27

## 2021-10-27 NOTE — TELEPHONE ENCOUNTER
----- Message from Rachel Pro MD sent at 10/26/2021  5:21 PM EDT -----  Normal low risk scan, patient cleared for surgery.

## 2021-10-27 NOTE — TELEPHONE ENCOUNTER
I sent this on another message, stress test was fine, she is cleared for surgery, I already addended my preop note, if needed please fax to surgical team.

## 2022-02-23 DIAGNOSIS — E03.9 ACQUIRED HYPOTHYROIDISM: Primary | ICD-10-CM

## 2022-02-23 RX ORDER — LEVOTHYROXINE SODIUM 0.05 MG/1
50 TABLET ORAL DAILY
Qty: 90 TABLET | Refills: 3 | Status: SHIPPED | OUTPATIENT
Start: 2022-02-23

## 2022-02-23 RX ORDER — GABAPENTIN 300 MG/1
CAPSULE ORAL
Qty: 540 CAPSULE | Refills: 3 | Status: SHIPPED | OUTPATIENT
Start: 2022-02-23 | End: 2022-05-24

## 2022-02-23 NOTE — TELEPHONE ENCOUNTER
Medication:   Requested Prescriptions     Pending Prescriptions Disp Refills    levothyroxine (SYNTHROID) 50 MCG tablet [Pharmacy Med Name: Levothyroxine Sodium 50 MCG Oral Tablet] 90 tablet 3     Sig: TAKE 1 TABLET BY MOUTH  DAILY    gabapentin (NEURONTIN) 300 MG capsule [Pharmacy Med Name: Gabapentin 300 MG Oral Capsule] 540 capsule 3     Sig: TAKE 2 CAPSULES BY MOUTH 3  TIMES DAILY     Last Filled:  3/27/21    Last appt: 10/18/2021   Next appt: Visit date not found    Last Labs DM: No results found for: LABA1C  Last Lipid:   Lab Results   Component Value Date    CHOL 252 01/19/2021    TRIG 72 01/19/2021    HDL 77 01/19/2021    HDL 57 05/11/2010    LDLCALC 161 01/19/2021     Last Thyroid:   Lab Results   Component Value Date    TSH 0.86 01/19/2021    T4FREE 1.4 01/06/2020

## 2022-08-10 LAB
ANION GAP SERPL CALCULATED.3IONS-SCNC: 13 MMOL/L (ref 3–16)
BUN BLDV-MCNC: 16 MG/DL (ref 7–20)
CALCIUM SERPL-MCNC: 9.3 MG/DL (ref 8.3–10.6)
CHLORIDE BLD-SCNC: 103 MMOL/L (ref 99–110)
CO2: 25 MMOL/L (ref 21–32)
CREAT SERPL-MCNC: 0.7 MG/DL (ref 0.6–1.2)
GFR AFRICAN AMERICAN: >60
GFR NON-AFRICAN AMERICAN: >60
GLUCOSE BLD-MCNC: 88 MG/DL (ref 70–99)
POTASSIUM SERPL-SCNC: 4.7 MMOL/L (ref 3.5–5.1)
SODIUM BLD-SCNC: 141 MMOL/L (ref 136–145)

## 2022-10-25 LAB — MAMMOGRAPHY, EXTERNAL: NORMAL

## 2023-01-26 NOTE — TELEPHONE ENCOUNTER
Any patient who has not been seen for over a year will always need an appointment before refills can be sent in

## 2023-01-26 NOTE — TELEPHONE ENCOUNTER
Medication:   Requested Prescriptions     Pending Prescriptions Disp Refills    levothyroxine (SYNTHROID) 50 MCG tablet [Pharmacy Med Name: Levothyroxine Sodium 50 MCG Oral Tablet] 90 tablet 3     Sig: TAKE 1 TABLET BY MOUTH  DAILY    gabapentin (NEURONTIN) 300 MG capsule [Pharmacy Med Name: Gabapentin 300 MG Oral Capsule] 540 capsule 3     Sig: TAKE 2 CAPSULES BY MOUTH 3  TIMES DAILY        Last Filled:      Patient Phone Number: [phone] (home)     Last appt: 10/18/2021   Next appt: Visit date not found    Last OARRS: No flowsheet data found. Preferred Pharmacy:   [facility] ([address] [facility]) - [address] [facility] [phone] - F [phone]  [address] [place] [address]  Phone: [phone] Fax: [phone]    [id number] [address], [address] [facility] [address] [address] [facility]  [address] [place] [address] [address] [facility]  Phone: [phone] Fax: [phone]    [facility] [address] [phone] Oscar Scott [phone]  [address] [address]  Phone: [phone] Fax: [phone]    [facility] ([facility] ) - [address] [phone] Oscar Scott [phone]  [facility] [address]  Phone: [phone] Fax: [phone]

## 2023-01-26 NOTE — TELEPHONE ENCOUNTER
Medication:   Requested Prescriptions     Pending Prescriptions Disp Refills    levothyroxine (SYNTHROID) 50 MCG tablet [Pharmacy Med Name: Levothyroxine Sodium 50 MCG Oral Tablet] 90 tablet 3     Sig: TAKE 1 TABLET BY MOUTH  DAILY    gabapentin (NEURONTIN) 300 MG capsule [Pharmacy Med Name: Gabapentin 300 MG Oral Capsule] 540 capsule 3     Sig: TAKE 2 CAPSULES BY MOUTH 3  TIMES DAILY     Last Filled:  02/23/2022    Last appt: 10/18/2021   Next appt: Visit date not found    Last OARRS: No flowsheet data found.

## 2023-01-27 RX ORDER — LEVOTHYROXINE SODIUM 0.05 MG/1
50 TABLET ORAL DAILY
Qty: 90 TABLET | Refills: 3 | OUTPATIENT
Start: 2023-01-27

## 2023-01-27 RX ORDER — GABAPENTIN 300 MG/1
CAPSULE ORAL
Qty: 540 CAPSULE | Refills: 3 | OUTPATIENT
Start: 2023-01-27 | End: 2023-04-27

## 2023-01-27 NOTE — TELEPHONE ENCOUNTER
I have not seen patient in over a year, please just make an appointment for any patient that I have not seen in over a year because I will not be able to prescribe any medications.

## 2023-01-30 ENCOUNTER — OFFICE VISIT (OUTPATIENT)
Dept: PRIMARY CARE CLINIC | Age: 63
End: 2023-01-30

## 2023-01-30 VITALS
TEMPERATURE: 96.8 F | BODY MASS INDEX: 22.16 KG/M2 | WEIGHT: 120.4 LBS | OXYGEN SATURATION: 95 % | HEIGHT: 62 IN | DIASTOLIC BLOOD PRESSURE: 68 MMHG | HEART RATE: 90 BPM | SYSTOLIC BLOOD PRESSURE: 112 MMHG

## 2023-01-30 DIAGNOSIS — Z23 NEED FOR VACCINATION FOR PNEUMOCOCCUS: ICD-10-CM

## 2023-01-30 DIAGNOSIS — G62.9 PERIPHERAL POLYNEUROPATHY: ICD-10-CM

## 2023-01-30 DIAGNOSIS — Z00.00 ENCOUNTER FOR ANNUAL PHYSICAL EXAM: Primary | ICD-10-CM

## 2023-01-30 DIAGNOSIS — G89.29 CHRONIC PAIN OF MULTIPLE JOINTS: ICD-10-CM

## 2023-01-30 DIAGNOSIS — M25.50 CHRONIC PAIN OF MULTIPLE JOINTS: ICD-10-CM

## 2023-01-30 DIAGNOSIS — Z23 NEED FOR SHINGLES VACCINE: ICD-10-CM

## 2023-01-30 LAB
ALBUMIN SERPL-MCNC: 4.4 G/DL (ref 3.4–5)
ALP BLD-CCNC: 86 U/L (ref 40–129)
ALT SERPL-CCNC: 12 U/L (ref 10–40)
ANION GAP SERPL CALCULATED.3IONS-SCNC: 12 MMOL/L (ref 3–16)
AST SERPL-CCNC: 18 U/L (ref 15–37)
BASOPHILS ABSOLUTE: 0.1 K/UL (ref 0–0.2)
BASOPHILS RELATIVE PERCENT: 0.5 %
BILIRUB SERPL-MCNC: <0.2 MG/DL (ref 0–1)
BILIRUBIN DIRECT: <0.2 MG/DL (ref 0–0.3)
BILIRUBIN, INDIRECT: NORMAL MG/DL (ref 0–1)
BUN BLDV-MCNC: 17 MG/DL (ref 7–20)
C-REACTIVE PROTEIN: <3 MG/L (ref 0–5.1)
CALCIUM SERPL-MCNC: 9.7 MG/DL (ref 8.3–10.6)
CHLORIDE BLD-SCNC: 101 MMOL/L (ref 99–110)
CHOLESTEROL, TOTAL: 276 MG/DL (ref 0–199)
CO2: 27 MMOL/L (ref 21–32)
CREAT SERPL-MCNC: 0.7 MG/DL (ref 0.6–1.2)
EOSINOPHILS ABSOLUTE: 0.1 K/UL (ref 0–0.6)
EOSINOPHILS RELATIVE PERCENT: 1.4 %
FOLATE: 9.31 NG/ML (ref 4.78–24.2)
GFR SERPL CREATININE-BSD FRML MDRD: >60 ML/MIN/{1.73_M2}
GLUCOSE BLD-MCNC: 92 MG/DL (ref 70–99)
HCT VFR BLD CALC: 42.7 % (ref 36–48)
HDLC SERPL-MCNC: 78 MG/DL (ref 40–60)
HEMOGLOBIN: 14.5 G/DL (ref 12–16)
LDL CHOLESTEROL CALCULATED: 175 MG/DL
LYMPHOCYTES ABSOLUTE: 2.3 K/UL (ref 1–5.1)
LYMPHOCYTES RELATIVE PERCENT: 21.4 %
MCH RBC QN AUTO: 31.5 PG (ref 26–34)
MCHC RBC AUTO-ENTMCNC: 34 G/DL (ref 31–36)
MCV RBC AUTO: 92.7 FL (ref 80–100)
MONOCYTES ABSOLUTE: 0.5 K/UL (ref 0–1.3)
MONOCYTES RELATIVE PERCENT: 4.7 %
NEUTROPHILS ABSOLUTE: 7.7 K/UL (ref 1.7–7.7)
NEUTROPHILS RELATIVE PERCENT: 72 %
PDW BLD-RTO: 13 % (ref 12.4–15.4)
PLATELET # BLD: 249 K/UL (ref 135–450)
PMV BLD AUTO: 9 FL (ref 5–10.5)
POTASSIUM SERPL-SCNC: 4.5 MMOL/L (ref 3.5–5.1)
RBC # BLD: 4.61 M/UL (ref 4–5.2)
RHEUMATOID FACTOR: 20 IU/ML
SODIUM BLD-SCNC: 140 MMOL/L (ref 136–145)
T4 FREE: 1.2 NG/DL (ref 0.9–1.8)
TOTAL PROTEIN: 6.7 G/DL (ref 6.4–8.2)
TRIGL SERPL-MCNC: 117 MG/DL (ref 0–150)
TSH SERPL DL<=0.05 MIU/L-ACNC: 0.79 UIU/ML (ref 0.27–4.2)
VITAMIN B-12: 674 PG/ML (ref 211–911)
VITAMIN D 25-HYDROXY: 28.7 NG/ML
VLDLC SERPL CALC-MCNC: 23 MG/DL
WBC # BLD: 10.6 K/UL (ref 4–11)

## 2023-01-30 RX ORDER — LEVOTHYROXINE SODIUM 0.05 MG/1
50 TABLET ORAL DAILY
Qty: 90 TABLET | Refills: 3 | Status: SHIPPED | OUTPATIENT
Start: 2023-01-30

## 2023-01-30 RX ORDER — CETIRIZINE HYDROCHLORIDE 10 MG/1
10 TABLET ORAL DAILY
Qty: 90 TABLET | Refills: 1 | Status: SHIPPED | OUTPATIENT
Start: 2023-01-30

## 2023-01-30 RX ORDER — MELOXICAM 10 MG/1
CAPSULE ORAL
COMMUNITY
End: 2023-01-30 | Stop reason: SDUPTHER

## 2023-01-30 RX ORDER — MONTELUKAST SODIUM 10 MG/1
10 TABLET ORAL NIGHTLY
Qty: 90 TABLET | Refills: 1 | Status: SHIPPED | OUTPATIENT
Start: 2023-01-30

## 2023-01-30 RX ORDER — MELOXICAM 10 MG/1
10 CAPSULE ORAL DAILY
Qty: 90 CAPSULE | Refills: 1 | Status: SHIPPED | OUTPATIENT
Start: 2023-01-30 | End: 2023-02-02

## 2023-01-30 RX ORDER — GABAPENTIN 300 MG/1
CAPSULE ORAL
Qty: 540 CAPSULE | Refills: 3 | Status: SHIPPED | OUTPATIENT
Start: 2023-01-30 | End: 2023-06-01

## 2023-01-30 SDOH — ECONOMIC STABILITY: FOOD INSECURITY: WITHIN THE PAST 12 MONTHS, THE FOOD YOU BOUGHT JUST DIDN'T LAST AND YOU DIDN'T HAVE MONEY TO GET MORE.: NEVER TRUE

## 2023-01-30 SDOH — ECONOMIC STABILITY: FOOD INSECURITY: WITHIN THE PAST 12 MONTHS, YOU WORRIED THAT YOUR FOOD WOULD RUN OUT BEFORE YOU GOT MONEY TO BUY MORE.: NEVER TRUE

## 2023-01-30 ASSESSMENT — PATIENT HEALTH QUESTIONNAIRE - PHQ9
1. LITTLE INTEREST OR PLEASURE IN DOING THINGS: 0
SUM OF ALL RESPONSES TO PHQ QUESTIONS 1-9: 0
2. FEELING DOWN, DEPRESSED OR HOPELESS: 0
SUM OF ALL RESPONSES TO PHQ QUESTIONS 1-9: 0
SUM OF ALL RESPONSES TO PHQ9 QUESTIONS 1 & 2: 0
SUM OF ALL RESPONSES TO PHQ QUESTIONS 1-9: 0
SUM OF ALL RESPONSES TO PHQ QUESTIONS 1-9: 0

## 2023-01-30 ASSESSMENT — SOCIAL DETERMINANTS OF HEALTH (SDOH): HOW HARD IS IT FOR YOU TO PAY FOR THE VERY BASICS LIKE FOOD, HOUSING, MEDICAL CARE, AND HEATING?: NOT HARD AT ALL

## 2023-01-30 NOTE — PROGRESS NOTES
2023    Sergey Hamilton (:  1960) is a 65 y.o. female, here for a preventive medicine evaluation. Patient Active Problem List   Diagnosis    Hypothyroid    Fibromyalgia    Irritable bowel syndrome    Biceps tendonitis    Lumbar radicular pain    Asthma    Hypercholesteremia    CHARLOTTE (obstructive sleep apnea)     Colonoscopy last year and mammogram   Hysterectomy      Asthma  - stable   - hardly ever uses the rescue inhaler    Synthroid     BL neuropathy on gabapentin 600 mg TID   stable    Chronic lower back pain   Stable     Trying to be more active    Has seen rheum a very long time ago, has a lot of chronic       Review of Systems   All other systems reviewed and are negative. Prior to Visit Medications    Medication Sig Taking? Authorizing Provider   levothyroxine (SYNTHROID) 50 MCG tablet Take 1 tablet by mouth daily Yes Kei Walker MD   beclomethasone (QVAR REDIHALER) 40 MCG/ACT AERB inhaler Inhale 2 puffs into the lungs in the morning and 2 puffs in the evening. Yes Kei Walker MD   gabapentin (NEURONTIN) 300 MG capsule TAKE 2 CAPSULES BY MOUTH 3  TIMES DAILY Yes Kei Walker MD   Meloxicam 10 MG CAPS Take 10 mg by mouth daily Take by mouth Yes Kei Walker MD   montelukast (SINGULAIR) 10 MG tablet Take 1 tablet by mouth nightly Yes Kei Walker MD   cetirizine (ZYRTEC) 10 MG tablet Take 1 tablet by mouth daily Yes Kei Walker MD   albuterol sulfate HFA (PROVENTIL;VENTOLIN;PROAIR) 108 (90 Base) MCG/ACT inhaler Inhale 2 puffs into the lungs every 6 hours as needed for Wheezing. Yes Ashly Blank MD   beclomethasone (QVAR) 40 MCG/ACT inhaler Inhale 2 puffs into the lungs 2 times daily.  Yes Ashly Blank MD        Allergies   Allergen Reactions    Percocet [Oxycodone-Acetaminophen] Shortness Of Breath     Itch and dyspnea    Lyrica [Pregabalin]      paresthesia    Chocolate Swelling and Other (See Comments)     Throat closes    Cymbalta [Duloxetine Hcl] Mental fogginess    Sulfa Antibiotics Hives       Past Medical History:   Diagnosis Date    Asthma     Fibromyalgia     Neuropathy of both feet 2013    small fiber, saw Dr. Yuridia Dominguez at [facility]    CHARLOTTE (obstructive sleep apnea)     couldn't tolerate CPAP    Rheumatoid arthritis(714.0)     Spinal stenosis, lumbar     PATTY twice in 2011       Past Surgical History:   Procedure Laterality Date    BREAST BIOPSY Left     HYSTERECTOMY, VAGINAL  1988    bladder repair too    NASAL SINUS SURGERY Bilateral 01/2016    blloon plasty    SHOULDER SURGERY Right Jan '18    biceps tendon repair, removed end of clavicle       Social History     Socioeconomic History    Marital status:      Spouse name: Marie Eddy    Number of children: 3    Years of education: Not on file    Highest education level: Not on file   Occupational History    Occupation: RN     Comment: [employer]   Tobacco Use    Smoking status: Never    Smokeless tobacco: Never   Substance and Sexual Activity    Alcohol use: No     Comment: very rare    Drug use: No    Sexual activity: Yes     Partners: Male   Other Topics Concern    Not on file   Social History Narrative    2 grandchildren    Walks and stairs for exercise     Social Determinants of Health     Financial Resource Strain: Low Risk     Difficulty of Paying Living Expenses: Not hard at all   Food Insecurity: No Food Insecurity    Worried About Running Out of Food in the Last Year: Never true    Ran Out of Food in the Last Year: Never true   Transportation Needs: Not on file   Physical Activity: Not on file   Stress: Not on file   Social Connections: Not on file   Intimate Partner Violence: Not on file   Housing Stability: Not on file        Family History   Problem Relation Age of Onset    High Cholesterol Mother     Cancer Mother         breast       ADVANCE DIRECTIVE: N, <no information>    Vitals:    01/30/23 1533   BP: 112/68   Site: Right Upper Arm   Position: Sitting   Cuff Size: Medium Adult   Pulse: 90 Temp: 96.8 °F (36 °C)   TempSrc: Infrared   SpO2: 95%   Weight: 120 lb 6.4 oz (54.6 kg)   Height: 5' 2\" (1.575 m)     Estimated body mass index is 22.02 kg/m² as calculated from the following:    Height as of this encounter: 5' 2\" (1.575 m). Weight as of this encounter: 120 lb 6.4 oz (54.6 kg). Physical Exam  Vitals reviewed. Constitutional:       General: She is not in acute distress. Appearance: Normal appearance. She is not ill-appearing. HENT:      Head: Normocephalic and atraumatic. Right Ear: Tympanic membrane, ear canal and external ear normal.      Left Ear: Tympanic membrane, ear canal and external ear normal.      Nose: Nose normal. No rhinorrhea. Mouth/Throat:      Mouth: Mucous membranes are moist.      Pharynx: Oropharynx is clear. No oropharyngeal exudate. Eyes:      General: No scleral icterus. Right eye: No discharge. Left eye: No discharge. Extraocular Movements: Extraocular movements intact. Conjunctiva/sclera: Conjunctivae normal.   Cardiovascular:      Rate and Rhythm: Normal rate and regular rhythm. Pulses: Normal pulses. Heart sounds: Normal heart sounds. No murmur heard. No friction rub. No gallop. Pulmonary:      Effort: Pulmonary effort is normal.      Breath sounds: Normal breath sounds. No wheezing, rhonchi or rales. Abdominal:      General: Abdomen is flat. Bowel sounds are normal. There is no distension. Palpations: Abdomen is soft. There is no mass. Musculoskeletal:         General: Normal range of motion. Cervical back: Neck supple. No muscular tenderness. Lymphadenopathy:      Cervical: No cervical adenopathy. Skin:     General: Skin is warm. Capillary Refill: Capillary refill takes less than 2 seconds. Findings: No rash. Neurological:      General: No focal deficit present. Mental Status: She is alert. Cranial Nerves: No cranial nerve deficit.    Psychiatric:         Mood and Affect: Mood normal.         Behavior: Behavior normal.       No flowsheet data found.     Lab Results   Component Value Date/Time    CHOL 252 01/19/2021 09:58 AM    CHOL 252 01/06/2020 07:42 AM    CHOL 273 09/22/2017 07:11 PM    CHOLFAST 238 01/11/2019 11:48 AM    TRIG 72 01/19/2021 09:58 AM    TRIG 95 01/06/2020 07:42 AM    TRIG 97 09/22/2017 07:11 PM    TRIGLYCFAST 74 01/11/2019 11:48 AM    HDL 77 01/19/2021 09:58 AM    HDL 66 01/06/2020 07:42 AM    HDL 78 01/11/2019 11:48 AM    HDL 57 05/11/2010 10:06 AM    LDLCALC 161 01/19/2021 09:58 AM    LDLCALC 167 01/06/2020 07:42 AM    LDLCALC 145 01/11/2019 11:48 AM    GLUF 90 01/11/2019 11:48 AM    GLUCOSE 88 08/09/2022 07:40 AM       The 10-year ASCVD risk score (Edvin PUENTES, et al., 2019) is: 2.7%    Values used to calculate the score:      Age: 65 years      Sex: Female      Is Non- : No      Diabetic: No      Tobacco smoker: No      Systolic Blood Pressure: 722 mmHg      Is BP treated: No      HDL Cholesterol: 77 mg/dL      Total Cholesterol: 252 mg/dL    Immunization History   Administered Date(s) Administered    COVID-19, PFIZER PURPLE top, DILUTE for use, (age 15 y+), 30mcg/0.3mL 03/17/2021, 04/07/2021, 12/21/2021    Tdap (Boostrix, Adacel) 11/28/2016       Health Maintenance   Topic Date Due    Pneumococcal 0-64 years Vaccine (1 - PCV) Never done    Depression Screen  Never done    HIV screen  Never done    Hepatitis C screen  Never done    Shingles vaccine (1 of 2) Never done    Colorectal Cancer Screen  01/13/2020    COVID-19 Vaccine (4 - Booster for Rosado Peter series) 02/15/2022    Flu vaccine (1) 01/30/2024 (Originally 8/1/2022)    Breast cancer screen  10/25/2024    Lipids  01/19/2026    DTaP/Tdap/Td vaccine (2 - Td or Tdap) 11/28/2026    Hepatitis A vaccine  Aged Out    Hib vaccine  Aged Out    Meningococcal (ACWY) vaccine  Aged Out       Assessment & Plan   Encounter for annual physical exam  -     Basic Metabolic Panel  -     Hepatic Function Panel  -     Lipid Panel  -     T4, Free  -     TSH  -     Urinalysis with Reflex to Culture    General wellness exam. Reviewed chart for past hx and updated today. Counseled on age appropriate health guidance and discussed screening recommendations. Vaccinations reviewed and discussed. All questions answered    Peripheral polyneuropathy  Chronic stable  Continue with gabapentin 600 mg 3 times daily  -     CBC with Auto Differential  -     Vitamin B12 & Folate  -     Vitamin D 25 Hydroxy  Chronic pain of multiple joints  Chronic  Get labs  Recommended yoga  -     C-Reactive Protein  -     CYCLIC CITRUL PEPTIDE ANTIBODY, IGG  -     RHEUMATOID FACTOR  -     Sedimentation Rate  Need for shingles vaccine  -     Zoster, SHINGRIX, (18 yrs +), IM  Need for vaccination for pneumococcus  -     Pneumococcal, PCV20, PREVNAR 20, (age 18 yrs+), IM, PF    General wellness exam. Reviewed chart for past hx and updated today. Counseled on age appropriate health guidance and discussed screening recommendations. Vaccinations reviewed and discussed. All questions answered      Patient declined flu shot today    Pt had a colonoscopy but she doesn't remember where    Return in about 6 months (around 7/30/2023) for chronic problems.         --Josefina Dunbar MD

## 2023-01-31 LAB
CYCLIC CITRULLINATED PEPTIDE ANTIBODY IGG: <0.5 U/ML (ref 0–2.9)
SEDIMENTATION RATE, ERYTHROCYTE: 7 MM/HR (ref 0–30)

## 2023-02-02 ENCOUNTER — TELEPHONE (OUTPATIENT)
Dept: PRIMARY CARE CLINIC | Age: 63
End: 2023-02-02

## 2023-02-02 DIAGNOSIS — M25.50 CHRONIC JOINT PAIN: Primary | ICD-10-CM

## 2023-02-02 DIAGNOSIS — G89.29 CHRONIC JOINT PAIN: Primary | ICD-10-CM

## 2023-02-02 DIAGNOSIS — R76.8 ELEVATED RHEUMATOID FACTOR: Primary | ICD-10-CM

## 2023-02-02 RX ORDER — NAPROXEN 250 MG/1
250 TABLET ORAL 2 TIMES DAILY WITH MEALS
Qty: 180 TABLET | Refills: 1 | Status: SHIPPED | OUTPATIENT
Start: 2023-02-02

## 2023-02-02 NOTE — TELEPHONE ENCOUNTER
Patient states that she has two red marks under prevnar 20 vaccination spot if worsend she will follow up

## 2023-02-02 NOTE — TELEPHONE ENCOUNTER
Meloxicam cap 10mg is not covered under [employer] is requesting  approval to dispense a covered alternative such as   Naproxen, Piroxicam, Etdodolac,Diclofenac

## 2023-02-02 NOTE — TELEPHONE ENCOUNTER
[id number] Danielle Reyes I can send in naproxen but tell her to always take this with food, also her rheumatoid factor lab came back significantly elevated so I want her to follow up with rheumatology, order placed for Dr Anaid Zaman, she is still taking new pts.     Also her Vit D level a little low, needs to take otc D3 2000 untis daily

## 2023-02-03 NOTE — PROGRESS NOTES
Spoke with patient about labs especially cholesterol, she does not want to start any cholesterol medication at this time went over risk versus benefits. She has number to call rheumatology she will call to set this up.

## 2023-05-25 ENCOUNTER — HOSPITAL ENCOUNTER (OUTPATIENT)
Dept: GENERAL RADIOLOGY | Age: 63
Discharge: HOME | End: 2023-05-25
Payer: COMMERCIAL

## 2023-05-25 ENCOUNTER — OFFICE VISIT (OUTPATIENT)
Dept: PRIMARY CARE CLINIC | Age: 63
End: 2023-05-25
Payer: COMMERCIAL

## 2023-05-25 VITALS
BODY MASS INDEX: 22.06 KG/M2 | WEIGHT: 120.6 LBS | TEMPERATURE: 97.4 F | DIASTOLIC BLOOD PRESSURE: 64 MMHG | OXYGEN SATURATION: 100 % | SYSTOLIC BLOOD PRESSURE: 114 MMHG | HEART RATE: 73 BPM

## 2023-05-25 DIAGNOSIS — S69.92XS HAND INJURY, LEFT, SEQUELA: Primary | ICD-10-CM

## 2023-05-25 DIAGNOSIS — S69.92XS HAND INJURY, LEFT, SEQUELA: ICD-10-CM

## 2023-05-25 PROCEDURE — 99213 OFFICE O/P EST LOW 20 MIN: CPT | Performed by: STUDENT IN AN ORGANIZED HEALTH CARE EDUCATION/TRAINING PROGRAM

## 2023-05-25 PROCEDURE — 73130 X-RAY EXAM OF HAND: CPT

## 2023-05-25 PROCEDURE — 73140 X-RAY EXAM OF FINGER(S): CPT

## 2023-05-25 SDOH — ECONOMIC STABILITY: HOUSING INSECURITY
IN THE LAST 12 MONTHS, WAS THERE A TIME WHEN YOU DID NOT HAVE A STEADY PLACE TO SLEEP OR SLEPT IN A SHELTER (INCLUDING NOW)?: NO

## 2023-05-25 SDOH — ECONOMIC STABILITY: FOOD INSECURITY: WITHIN THE PAST 12 MONTHS, YOU WORRIED THAT YOUR FOOD WOULD RUN OUT BEFORE YOU GOT MONEY TO BUY MORE.: NEVER TRUE

## 2023-05-25 SDOH — ECONOMIC STABILITY: INCOME INSECURITY: HOW HARD IS IT FOR YOU TO PAY FOR THE VERY BASICS LIKE FOOD, HOUSING, MEDICAL CARE, AND HEATING?: NOT HARD AT ALL

## 2023-05-25 SDOH — ECONOMIC STABILITY: FOOD INSECURITY: WITHIN THE PAST 12 MONTHS, THE FOOD YOU BOUGHT JUST DIDN'T LAST AND YOU DIDN'T HAVE MONEY TO GET MORE.: NEVER TRUE

## 2023-05-25 NOTE — PROGRESS NOTES
Kim Chen (:  1960) is a 59 y.o. female,Established patient, here for evaluation of the following chief complaint(s):  Follow-up (Left hand )         ASSESSMENT/PLAN:  1. Hand injury, left, sequela  -     [facility] - Juanito Springer MD, Hand Surgery (Hand, Wrist, Upper Extremity), [place]  -     XR HAND LEFT (MIN 3 VIEWS); Future  -     XR FINGER LEFT (MIN 2 VIEWS); Future    Ice compression, thumb spica splint otc  NSAIDs  Return if symptoms worsen or fail to improve. Subjective   SUBJECTIVE/OBJECTIVE:  HPI  Follow-up from the emergency room on  she was building a deck and stepped on a board it broke she fell onto her left hand now having pain and swelling worse in the left thumb and forefinger, able to move fingers but unable to make a tight  getting numbness and tingling    Review of Systems   All other systems reviewed and are negative. Objective   Physical Exam  Constitutional:       Appearance: Normal appearance. HENT:      Head: Normocephalic and atraumatic. Nose: Nose normal.      Mouth/Throat:      Mouth: Mucous membranes are moist.   Eyes:      Extraocular Movements: Extraocular movements intact. Cardiovascular:      Rate and Rhythm: Normal rate and regular rhythm. Heart sounds: Normal heart sounds. No murmur heard. No friction rub. No gallop. Pulmonary:      Effort: Pulmonary effort is normal.      Breath sounds: Normal breath sounds. No wheezing, rhonchi or rales. Musculoskeletal:      Comments: Left hand weak handgrip significant TTP thumb and forefinger and metacarpal tenderness with swelling/bruising, normal wrist flexion extension and no pain in forearm or elbow. Limited passive flexion/extension of first second and third digits on left hand secondary to pain   Skin:     General: Skin is warm. Neurological:      General: No focal deficit present. Mental Status: She is alert.    Psychiatric:         Mood and Affect: Mood

## 2023-07-12 RX ORDER — MONTELUKAST SODIUM 10 MG/1
10 TABLET ORAL NIGHTLY
Qty: 90 TABLET | Refills: 3 | Status: SHIPPED | OUTPATIENT
Start: 2023-07-12

## 2023-07-12 NOTE — TELEPHONE ENCOUNTER
Medication:   Requested Prescriptions     Pending Prescriptions Disp Refills    montelukast (SINGULAIR) 10 MG tablet [Pharmacy Med Name: Montelukast Sodium 10 MG Oral Tablet] 90 tablet 3     Sig: TAKE 1 TABLET BY MOUTH NIGHTLY     Last Filled:  1.30.23    Last appt: 5/25/2023   Next appt: Visit date not found    Last OARRS: No flowsheet data found.

## 2023-10-03 ENCOUNTER — OFFICE VISIT (OUTPATIENT)
Dept: PRIMARY CARE CLINIC | Age: 63
End: 2023-10-03
Payer: COMMERCIAL

## 2023-10-03 VITALS
TEMPERATURE: 99.8 F | OXYGEN SATURATION: 96 % | SYSTOLIC BLOOD PRESSURE: 116 MMHG | WEIGHT: 122.4 LBS | BODY MASS INDEX: 22.39 KG/M2 | RESPIRATION RATE: 16 BRPM | HEART RATE: 89 BPM | DIASTOLIC BLOOD PRESSURE: 68 MMHG

## 2023-10-03 DIAGNOSIS — M25.551 CHRONIC PAIN OF BOTH HIPS: ICD-10-CM

## 2023-10-03 DIAGNOSIS — G89.29 CHRONIC PAIN OF BOTH HIPS: ICD-10-CM

## 2023-10-03 DIAGNOSIS — Z98.1 S/P LUMBAR SPINAL FUSION: ICD-10-CM

## 2023-10-03 DIAGNOSIS — M79.7 FIBROMYALGIA: ICD-10-CM

## 2023-10-03 DIAGNOSIS — Z02.71 DISABILITY EXAMINATION: Primary | ICD-10-CM

## 2023-10-03 DIAGNOSIS — M25.552 CHRONIC PAIN OF BOTH HIPS: ICD-10-CM

## 2023-10-03 PROCEDURE — 99213 OFFICE O/P EST LOW 20 MIN: CPT | Performed by: FAMILY MEDICINE

## 2023-10-03 ASSESSMENT — ENCOUNTER SYMPTOMS
EYE ITCHING: 0
EYE DISCHARGE: 0
BACK PAIN: 1
TROUBLE SWALLOWING: 0
SHORTNESS OF BREATH: 0
COUGH: 0
SORE THROAT: 0

## 2023-10-03 NOTE — PROGRESS NOTES
Jr Smith (:  1960) is a 59 y.o. female,Established patient, here for evaluation of the following chief complaint(s):  Joint Swelling (Follow up for orthopedic specialist. Talk about disability paperwork. )      ASSESSMENT/PLAN:  1. Disability examination  -     PT functional capacity evaluation (FCE); Future  2. Fibromyalgia  -     PT functional capacity evaluation (FCE); Future  3. S/P lumbar spinal fusion  Assessment & Plan:     4. Chronic pain of both hips  Assessment & Plan:     Discussed with patient that we will need records from [facility] as we do not have anything in her chart along with that we will need a functional capacity testing with physical therapy. Patient was given a copy of the paperwork, the original was kept at the office here    We will need both of the above done before visit the paperwork can be signed  Since she will not be following with orthopedics regularly advised that she will need to be seen in our office every 3 months for fibromyalgia/chronic joint pains including but not limited to hips  Patient agreeable to plan and demonstrates understand    Return in about 3 months (around 1/3/2024), or if symptoms worsen or fail to improve, for fibromyalgia.     SUBJECTIVE/OBJECTIVE:  HPI    Patient presents for disability paperwork  Fibromyalgia  Lumbar Pain   Chronic Hip Pain  - works from home - supposed to do at the office as well  - spinal fusion 2021  - more of a physical therapy  - used to follow with ortho - for back and hips  - looking for accommodations - to be able to work from home - since her back surgery - \"fibromyalgia has kicked up\"  - patient was following with Rheumatology - was seen but they do not treat fibromyalgia   - has to drive 45 min-1 hr hurts her back  - at the office - she wears particular clothes - and slippers due to her fibromyalgia                                                                          - has done physical therapy in

## 2023-10-04 DIAGNOSIS — Z02.71 ENCOUNTER FOR DISABILITY EXAMINATION: Primary | ICD-10-CM

## 2023-10-09 ENCOUNTER — PATIENT MESSAGE (OUTPATIENT)
Dept: PRIMARY CARE CLINIC | Age: 63
End: 2023-10-09

## 2023-10-09 DIAGNOSIS — Z98.1 S/P LUMBAR SPINAL FUSION: Primary | ICD-10-CM

## 2023-10-09 DIAGNOSIS — M25.50 CHRONIC PAIN OF MULTIPLE JOINTS: ICD-10-CM

## 2023-10-09 DIAGNOSIS — M25.552 CHRONIC PAIN OF BOTH HIPS: ICD-10-CM

## 2023-10-09 DIAGNOSIS — M25.551 CHRONIC PAIN OF BOTH HIPS: ICD-10-CM

## 2023-10-09 DIAGNOSIS — G89.29 CHRONIC PAIN OF MULTIPLE JOINTS: ICD-10-CM

## 2023-10-09 DIAGNOSIS — G89.29 CHRONIC PAIN OF BOTH HIPS: ICD-10-CM

## 2023-10-09 NOTE — TELEPHONE ENCOUNTER
From: Louie Harry  To: Dr. Merary Paredes  Sent: 10/9/2023 10:35 AM EDT  Subject: ADA paperwork    Dr. Maria Fernanda Olmstead, I wanted to add to my previous note that I am willing to participate in treatment such as massage therapy, acupuncture or physical therapy. Physical therapy does increase my pain but I am willing to try it again. I think that anything that may lessen my pain I am willing to do. My  states that after talking to me that she feels this is going to be a longer term disability and that they may only have to review my paperwork every 6 months to a year if it is filled out that way but stated that the first paperwork can be temporary for a couple of months to get me started. Please let me know if you need anything else. Please let me know when my paperwork is submitted as it needs to be completed by 10/25/23.  Thank you

## 2023-10-11 NOTE — TELEPHONE ENCOUNTER
Paperwork completed, signed and ready to be faxed.   Please make sure we keep a copy for her chart as this is only valid for 4 months and will likely need to be renewed in February 2024      Dr. Gore Prior

## 2023-10-13 ENCOUNTER — TELEPHONE (OUTPATIENT)
Dept: PRIMARY CARE CLINIC | Age: 63
End: 2023-10-13

## 2023-10-13 NOTE — TELEPHONE ENCOUNTER
Rosalene Collet from [facility] called and stated she would like  to speak to somebody regarding the accomodation form from 10/11/23.  She states she needs clarification on Question 16 and she also states question 7 is answered no but she states if pt is going to have accomodation it needs to be substantial cause  Call back #[phone]

## 2023-10-17 ENCOUNTER — HOSPITAL ENCOUNTER (OUTPATIENT)
Dept: OCCUPATIONAL THERAPY | Age: 63
Setting detail: THERAPIES SERIES
Discharge: HOME | End: 2023-10-17
Attending: FAMILY MEDICINE
Payer: COMMERCIAL

## 2023-10-17 PROCEDURE — 97750 PHYSICAL PERFORMANCE TEST: CPT

## 2023-10-18 NOTE — TELEPHONE ENCOUNTER
Called in the morning, no one was available    Called again in the afternoon around 1:15 PM, specialist not available and they advised that they would call us back and let us know when she can speak with us  Confirmed phone number      Dr. Willoughby Favor

## 2023-11-28 ENCOUNTER — TELEPHONE (OUTPATIENT)
Dept: PRIMARY CARE CLINIC | Age: 63
End: 2023-11-28

## 2023-11-28 ENCOUNTER — PATIENT MESSAGE (OUTPATIENT)
Dept: PRIMARY CARE CLINIC | Age: 63
End: 2023-11-28

## 2023-11-28 DIAGNOSIS — M25.551 CHRONIC PAIN OF BOTH HIPS: ICD-10-CM

## 2023-11-28 DIAGNOSIS — M25.552 CHRONIC PAIN OF BOTH HIPS: ICD-10-CM

## 2023-11-28 DIAGNOSIS — Z98.1 S/P LUMBAR SPINAL FUSION: Primary | ICD-10-CM

## 2023-11-28 DIAGNOSIS — G89.29 CHRONIC PAIN OF MULTIPLE JOINTS: ICD-10-CM

## 2023-11-28 DIAGNOSIS — G89.29 CHRONIC PAIN OF BOTH HIPS: ICD-10-CM

## 2023-11-28 DIAGNOSIS — M25.50 CHRONIC PAIN OF MULTIPLE JOINTS: ICD-10-CM

## 2023-11-28 NOTE — TELEPHONE ENCOUNTER
Can new order for physical therapy be placed?     ----- Message from Oneyda Styles sent at 11/28/2023  2:31 PM EST -----  Regarding: Physical therapy  Contact: [phone]  I call [facility] and they said the never received my therapy script. Can you fax it again to [phone]. Thank you

## 2023-11-28 NOTE — TELEPHONE ENCOUNTER
From: Pari Méndez  To: Dr. Short Edwards: 11/28/2023 2:31 PM EST  Subject: Physical therapy    I call [facility] and they said the never received my therapy script. Can you fax it again to [phone].  Thank you

## 2023-12-27 RX ORDER — GABAPENTIN 300 MG/1
CAPSULE ORAL
Qty: 540 CAPSULE | Refills: 0 | Status: SHIPPED | OUTPATIENT
Start: 2023-12-27 | End: 2024-04-27

## 2023-12-27 RX ORDER — LEVOTHYROXINE SODIUM 0.05 MG/1
50 TABLET ORAL DAILY
Qty: 90 TABLET | Refills: 0 | Status: SHIPPED | OUTPATIENT
Start: 2023-12-27

## 2023-12-27 NOTE — TELEPHONE ENCOUNTER
Medication:   Requested Prescriptions     Pending Prescriptions Disp Refills    levothyroxine (SYNTHROID) 50 MCG tablet [Pharmacy Med Name: Levothyroxine Sodium 50 MCG Oral Tablet] 90 tablet 0     Sig: TAKE 1 TABLET BY MOUTH DAILY     Last Filled:  1/30/2023    Last appt: 10/3/2023   Next appt: Visit date not found    Last Thyroid:   Lab Results   Component Value Date/Time    TSH 0.79 01/30/2023 04:11 PM    T4FREE 1.2 01/30/2023 04:11 PM

## 2023-12-27 NOTE — TELEPHONE ENCOUNTER
Medication:   Requested Prescriptions     Pending Prescriptions Disp Refills    gabapentin (NEURONTIN) 300 MG capsule [Pharmacy Med Name: Gabapentin 300 MG Oral Capsule] 180 capsule 0     Sig: TAKE 2 CAPSULES BY MOUTH 3 TIMES DAILY     Last Filled:  1/30/2023    Last appt: 10/3/2023   Next appt: Visit date not found    Last OARRS:        No data to display

## 2024-01-30 ENCOUNTER — OFFICE VISIT (OUTPATIENT)
Dept: PRIMARY CARE CLINIC | Age: 64
End: 2024-01-30
Payer: COMMERCIAL

## 2024-01-30 VITALS
HEART RATE: 82 BPM | BODY MASS INDEX: 22.68 KG/M2 | OXYGEN SATURATION: 97 % | RESPIRATION RATE: 12 BRPM | DIASTOLIC BLOOD PRESSURE: 70 MMHG | WEIGHT: 124 LBS | SYSTOLIC BLOOD PRESSURE: 110 MMHG | TEMPERATURE: 97 F

## 2024-01-30 DIAGNOSIS — E03.9 ACQUIRED HYPOTHYROIDISM: ICD-10-CM

## 2024-01-30 DIAGNOSIS — Z00.00 ENCOUNTER FOR WELL ADULT EXAM WITHOUT ABNORMAL FINDINGS: ICD-10-CM

## 2024-01-30 DIAGNOSIS — M79.7 FIBROMYALGIA: ICD-10-CM

## 2024-01-30 DIAGNOSIS — Z00.00 ENCOUNTER FOR WELL ADULT EXAM WITHOUT ABNORMAL FINDINGS: Primary | ICD-10-CM

## 2024-01-30 LAB
ANION GAP SERPL CALCULATED.3IONS-SCNC: 11 MMOL/L (ref 3–16)
BACTERIA URNS QL MICRO: NORMAL /HPF
BILIRUB UR QL STRIP.AUTO: NEGATIVE
BUN SERPL-MCNC: 19 MG/DL (ref 7–20)
CALCIUM SERPL-MCNC: 9.2 MG/DL (ref 8.3–10.6)
CHLORIDE SERPL-SCNC: 102 MMOL/L (ref 99–110)
CHOLEST SERPL-MCNC: 268 MG/DL (ref 0–199)
CLARITY UR: CLEAR
CO2 SERPL-SCNC: 28 MMOL/L (ref 21–32)
COLOR UR: YELLOW
CREAT SERPL-MCNC: 0.8 MG/DL (ref 0.6–1.2)
EPI CELLS #/AREA URNS AUTO: 2 /HPF (ref 0–5)
GFR SERPLBLD CREATININE-BSD FMLA CKD-EPI: >60 ML/MIN/{1.73_M2}
GLUCOSE SERPL-MCNC: 90 MG/DL (ref 70–99)
GLUCOSE UR STRIP.AUTO-MCNC: NEGATIVE MG/DL
HDLC SERPL-MCNC: 70 MG/DL (ref 40–60)
HGB UR QL STRIP.AUTO: NEGATIVE
HYALINE CASTS #/AREA URNS AUTO: 0 /LPF (ref 0–8)
KETONES UR STRIP.AUTO-MCNC: NEGATIVE MG/DL
LDLC SERPL CALC-MCNC: 178 MG/DL
LEUKOCYTE ESTERASE UR QL STRIP.AUTO: ABNORMAL
NITRITE UR QL STRIP.AUTO: NEGATIVE
PH UR STRIP.AUTO: 6 [PH] (ref 5–8)
POTASSIUM SERPL-SCNC: 4.1 MMOL/L (ref 3.5–5.1)
PROT UR STRIP.AUTO-MCNC: NEGATIVE MG/DL
RBC CLUMPS #/AREA URNS AUTO: 1 /HPF (ref 0–4)
SODIUM SERPL-SCNC: 141 MMOL/L (ref 136–145)
SP GR UR STRIP.AUTO: 1.01 (ref 1–1.03)
TRIGL SERPL-MCNC: 99 MG/DL (ref 0–150)
TSH SERPL DL<=0.005 MIU/L-ACNC: 1.61 UIU/ML (ref 0.27–4.2)
UA COMPLETE W REFLEX CULTURE PNL UR: ABNORMAL
UA DIPSTICK W REFLEX MICRO PNL UR: YES
URN SPEC COLLECT METH UR: ABNORMAL
UROBILINOGEN UR STRIP-ACNC: 0.2 E.U./DL
VLDLC SERPL CALC-MCNC: 20 MG/DL
WBC #/AREA URNS AUTO: 2 /HPF (ref 0–5)

## 2024-01-30 PROCEDURE — 99396 PREV VISIT EST AGE 40-64: CPT | Performed by: STUDENT IN AN ORGANIZED HEALTH CARE EDUCATION/TRAINING PROGRAM

## 2024-01-30 RX ORDER — CYCLOBENZAPRINE HCL 5 MG
5 TABLET ORAL NIGHTLY PRN
Qty: 30 TABLET | Refills: 0 | Status: SHIPPED | OUTPATIENT
Start: 2024-01-30 | End: 2024-02-29

## 2024-01-30 ASSESSMENT — PATIENT HEALTH QUESTIONNAIRE - PHQ9
2. FEELING DOWN, DEPRESSED OR HOPELESS: 1
SUM OF ALL RESPONSES TO PHQ9 QUESTIONS 1 & 2: 1
SUM OF ALL RESPONSES TO PHQ QUESTIONS 1-9: 1
1. LITTLE INTEREST OR PLEASURE IN DOING THINGS: 0
SUM OF ALL RESPONSES TO PHQ QUESTIONS 1-9: 1

## 2024-01-30 NOTE — PROGRESS NOTES
Well Adult Note  Name: Oneyda Styles Today’s Date: 2024   MRN: [id number] Sex: Female   Age: 63 y.o. Ethnicity: Non- / Non    : 1960 Race: White (non-)      Oneyda Styles is here for well adult exam.  History:    Pt has paperwork to work from home due to pain from fibromyalgia, has been doing this for 4 years.  Was seeing ortho and getting surgery and injections and fibromyalgia is what is causing it.  Suffers from lower back and bilateral hip pain.  Saw rheum last year and was told it was fibromyalgia. Has tried cymbalta and had hallucinations and lyrica had numbness and tingling in mouth and tongue and was told to stop taking it     Gets constipated daily since back surgery 2021 and will take laxatives      Review of Systems   All other systems reviewed and are negative.      Allergies   Allergen Reactions    Percocet [Oxycodone-Acetaminophen] Shortness Of Breath     Itch and dyspnea    Lyrica [Pregabalin]      paresthesia    Chocolate Swelling and Other (See Comments)     Throat closes    Cymbalta [Duloxetine Hcl]      Mental fogginess    Sulfa Antibiotics Hives         Prior to Visit Medications    Medication Sig Taking? Authorizing Provider   cyclobenzaprine (FLEXERIL) 5 MG tablet Take 1 tablet by mouth nightly as needed for Muscle spasms Yes Josefina Dunbar MD   levothyroxine (SYNTHROID) 50 MCG tablet TAKE 1 TABLET BY MOUTH DAILY Yes Jocelin Reece MD   gabapentin (NEURONTIN) 300 MG capsule TAKE 2 CAPSULES BY MOUTH 3 TIMES DAILY Yes Jocelin Reece MD   montelukast (SINGULAIR) 10 MG tablet TAKE 1 TABLET BY MOUTH NIGHTLY Yes Josefina Dunbar MD   cetirizine (ZYRTEC) 10 MG tablet Take 1 tablet by mouth daily Yes Josefina Dunbar MD   albuterol sulfate HFA (PROVENTIL;VENTOLIN;PROAIR) 108 (90 Base) MCG/ACT inhaler Inhale 2 puffs into the lungs every 6 hours as needed for Wheezing Yes Marvin Smiley MD   naproxen (NAPROSYN) 250 MG tablet Take 1 tablet by mouth 2 times

## 2024-01-31 DIAGNOSIS — E78.2 MIXED HYPERLIPIDEMIA: Primary | ICD-10-CM

## 2024-01-31 RX ORDER — ROSUVASTATIN CALCIUM 5 MG/1
5 TABLET, COATED ORAL DAILY
Qty: 90 TABLET | Refills: 1 | Status: SHIPPED | OUTPATIENT
Start: 2024-01-31

## 2024-02-13 ENCOUNTER — PATIENT MESSAGE (OUTPATIENT)
Dept: PRIMARY CARE CLINIC | Age: 64
End: 2024-02-13

## 2024-02-13 NOTE — TELEPHONE ENCOUNTER
From: Oneyda Styles  To: Dr. Josefina Dunbar  Sent: 2/13/2024 12:44 PM EST  Subject: ADA Paper work    just f/u with you on if you were able to send over an addendum on my paperwork to read \"work from home\" instead of \"work from home 2 days a week\".

## 2024-03-11 RX ORDER — LEVOTHYROXINE SODIUM 0.05 MG/1
50 TABLET ORAL DAILY
Qty: 90 TABLET | Refills: 3 | Status: SHIPPED | OUTPATIENT
Start: 2024-03-11

## 2024-03-11 RX ORDER — GABAPENTIN 300 MG/1
CAPSULE ORAL
Qty: 540 CAPSULE | Refills: 3 | Status: SHIPPED | OUTPATIENT
Start: 2024-03-11 | End: 2024-06-10

## 2024-03-11 NOTE — TELEPHONE ENCOUNTER
Medication:   Requested Prescriptions     Pending Prescriptions Disp Refills    levothyroxine (SYNTHROID) 50 MCG tablet [Pharmacy Med Name: Levothyroxine Sodium 50 MCG Oral Tablet] 90 tablet 3     Sig: TAKE 1 TABLET BY MOUTH DAILY    gabapentin (NEURONTIN) 300 MG capsule [Pharmacy Med Name: GABAPENTIN CAP 300MG (NEUR)] 540 capsule 3     Sig: TAKE 2 CAPSULES BY MOUTH 3 TIMES DAILY     Last Filled:  12.27.23    Last appt: 1/30/2024   Next appt: Visit date not found    Last OARRS:        No data to display

## 2024-07-15 ENCOUNTER — OFFICE VISIT (OUTPATIENT)
Dept: PRIMARY CARE CLINIC | Age: 64
End: 2024-07-15
Payer: COMMERCIAL

## 2024-07-15 ENCOUNTER — HOSPITAL ENCOUNTER (OUTPATIENT)
Dept: GENERAL RADIOLOGY | Age: 64
Discharge: HOME | End: 2024-07-15
Payer: COMMERCIAL

## 2024-07-15 VITALS
HEIGHT: 62 IN | TEMPERATURE: 96.9 F | BODY MASS INDEX: 23.19 KG/M2 | OXYGEN SATURATION: 97 % | SYSTOLIC BLOOD PRESSURE: 118 MMHG | WEIGHT: 126.01 LBS | DIASTOLIC BLOOD PRESSURE: 76 MMHG | HEART RATE: 89 BPM

## 2024-07-15 DIAGNOSIS — M25.511 ACUTE PAIN OF RIGHT SHOULDER: ICD-10-CM

## 2024-07-15 DIAGNOSIS — R07.9 CHEST PAIN, UNSPECIFIED TYPE: ICD-10-CM

## 2024-07-15 DIAGNOSIS — M25.511 ACUTE PAIN OF RIGHT SHOULDER: Primary | ICD-10-CM

## 2024-07-15 PROCEDURE — 73030 X-RAY EXAM OF SHOULDER: CPT

## 2024-07-15 PROCEDURE — 99214 OFFICE O/P EST MOD 30 MIN: CPT | Performed by: STUDENT IN AN ORGANIZED HEALTH CARE EDUCATION/TRAINING PROGRAM

## 2024-07-15 PROCEDURE — 93000 ELECTROCARDIOGRAM COMPLETE: CPT | Performed by: STUDENT IN AN ORGANIZED HEALTH CARE EDUCATION/TRAINING PROGRAM

## 2024-07-15 SDOH — ECONOMIC STABILITY: FOOD INSECURITY: WITHIN THE PAST 12 MONTHS, THE FOOD YOU BOUGHT JUST DIDN'T LAST AND YOU DIDN'T HAVE MONEY TO GET MORE.: NEVER TRUE

## 2024-07-15 SDOH — ECONOMIC STABILITY: INCOME INSECURITY: HOW HARD IS IT FOR YOU TO PAY FOR THE VERY BASICS LIKE FOOD, HOUSING, MEDICAL CARE, AND HEATING?: NOT HARD AT ALL

## 2024-07-15 SDOH — ECONOMIC STABILITY: FOOD INSECURITY: WITHIN THE PAST 12 MONTHS, YOU WORRIED THAT YOUR FOOD WOULD RUN OUT BEFORE YOU GOT MONEY TO BUY MORE.: NEVER TRUE

## 2024-07-15 NOTE — PROGRESS NOTES
Appearance: Normal appearance.   HENT:      Head: Normocephalic and atraumatic.      Nose: Nose normal.      Mouth/Throat:      Mouth: Mucous membranes are moist.   Eyes:      Extraocular Movements: Extraocular movements intact.   Cardiovascular:      Rate and Rhythm: Normal rate and regular rhythm.      Heart sounds: Normal heart sounds. No murmur heard.     No friction rub. No gallop.   Pulmonary:      Effort: Pulmonary effort is normal.      Breath sounds: Normal breath sounds. No wheezing, rhonchi or rales.   Musculoskeletal:      Comments: Right shoulder  Hypersensitive along superior border trapezius muscle  Unable to ER/IR past 30 degrees  Tender subacromial space   Skin:     General: Skin is warm.   Neurological:      General: No focal deficit present.      Mental Status: She is alert.   Psychiatric:         Mood and Affect: Mood normal.                  An electronic signature was used to authenticate this note.    --Josefina Dunbar MD

## 2024-07-18 ENCOUNTER — HOSPITAL ENCOUNTER (OUTPATIENT)
Age: 64
Discharge: HOME | End: 2024-07-18
Payer: COMMERCIAL

## 2024-07-18 VITALS
SYSTOLIC BLOOD PRESSURE: 105 MMHG | TEMPERATURE: 98.4 F | DIASTOLIC BLOOD PRESSURE: 69 MMHG | OXYGEN SATURATION: 98 % | HEART RATE: 61 BPM

## 2024-07-18 DIAGNOSIS — R07.9 CHEST PAIN, UNSPECIFIED TYPE: ICD-10-CM

## 2024-07-18 LAB
EGFR, POC: 83 ML/MIN/1.73M2
POC CREATININE: 0.8 MG/DL (ref 0.6–1.2)

## 2024-07-18 PROCEDURE — 6370000000 HC RX 637 (ALT 250 FOR IP): Performed by: RADIOLOGY

## 2024-07-18 PROCEDURE — 75574 CT ANGIO HRT W/3D IMAGE: CPT

## 2024-07-18 PROCEDURE — 82565 ASSAY OF CREATININE: CPT

## 2024-07-18 PROCEDURE — 2500000003 HC RX 250 WO HCPCS: Performed by: RADIOLOGY

## 2024-07-18 PROCEDURE — 6360000004 HC RX CONTRAST MEDICATION: Performed by: STUDENT IN AN ORGANIZED HEALTH CARE EDUCATION/TRAINING PROGRAM

## 2024-07-18 RX ORDER — NITROGLYCERIN 0.4 MG/1
0.8 TABLET SUBLINGUAL ONCE
Status: DISCONTINUED | OUTPATIENT
Start: 2024-07-18 | End: 2024-07-18

## 2024-07-18 RX ORDER — NITROGLYCERIN 0.4 MG/1
0.4 TABLET SUBLINGUAL ONCE
Status: COMPLETED | OUTPATIENT
Start: 2024-07-18 | End: 2024-07-18

## 2024-07-18 RX ORDER — METOPROLOL TARTRATE 1 MG/ML
5 INJECTION, SOLUTION INTRAVENOUS EVERY 5 MIN PRN
Status: DISCONTINUED | OUTPATIENT
Start: 2024-07-18 | End: 2024-07-18

## 2024-07-18 RX ORDER — METOPROLOL TARTRATE 1 MG/ML
5 INJECTION, SOLUTION INTRAVENOUS EVERY 5 MIN PRN
Status: DISCONTINUED | OUTPATIENT
Start: 2024-07-18 | End: 2024-07-19 | Stop reason: HOSPADM

## 2024-07-18 RX ORDER — NITROGLYCERIN 0.4 MG/1
0.4 TABLET SUBLINGUAL ONCE
Status: DISCONTINUED | OUTPATIENT
Start: 2024-07-18 | End: 2024-07-18

## 2024-07-18 RX ADMIN — IOPAMIDOL 100 ML: 755 INJECTION, SOLUTION INTRAVENOUS at 09:38

## 2024-07-18 RX ADMIN — METOPROLOL TARTRATE 2.5 MG: 1 INJECTION, SOLUTION INTRAVENOUS at 09:18

## 2024-07-18 RX ADMIN — NITROGLYCERIN 0.4 MG: 0.4 TABLET SUBLINGUAL at 09:15

## 2024-07-18 RX ADMIN — METOPROLOL TARTRATE 5 MG: 1 INJECTION, SOLUTION INTRAVENOUS at 09:12

## 2024-07-18 ASSESSMENT — PAIN SCALES - GENERAL: PAINLEVEL_OUTOF10: 0

## 2024-07-18 NOTE — PROGRESS NOTES
Pt arrived for schedule CTA:  [x] Allergies reviewed  [x] Vital signs stable on arrival  [x] CTA medications by ordering physician reviewed  [x] NPO x4 hours prior and no caffeine for 12hrs    See MAR for medications administered during test.    SBP <100 during exam, MD notified. Pt tolerated, and vital signs stable.     30 minutes post procedure patient asymptomatic and vital signs stable.  Patient escorted to lobby with staff member.

## 2024-07-19 ENCOUNTER — PATIENT MESSAGE (OUTPATIENT)
Dept: PRIMARY CARE CLINIC | Age: 64
End: 2024-07-19

## 2024-07-19 DIAGNOSIS — M25.511 CHRONIC RIGHT SHOULDER PAIN: Primary | ICD-10-CM

## 2024-07-19 DIAGNOSIS — G89.29 CHRONIC RIGHT SHOULDER PAIN: Primary | ICD-10-CM

## 2024-07-19 NOTE — TELEPHONE ENCOUNTER
From: Oneyda Styles  To: Dr. Josefina Dunbar  Sent: 7/19/2024 10:08 AM EDT  Subject: right shoulder    I have an appointment with the orthopedist on 8/1/24 for the right shoulder. I think I would like to go ahead and get an MRI of the shoulder now so that I can have it when I see the orthopedist. Let me know where I need to go to get the MRI. Thanks.

## 2024-07-25 ENCOUNTER — HOSPITAL ENCOUNTER (OUTPATIENT)
Dept: MRI IMAGING | Age: 64
Discharge: HOME | End: 2024-07-25
Payer: COMMERCIAL

## 2024-07-25 DIAGNOSIS — S46.111A LABRAL TEAR OF LONG HEAD OF BICEPS TENDON, RIGHT, INITIAL ENCOUNTER: Primary | ICD-10-CM

## 2024-07-25 DIAGNOSIS — M25.511 CHRONIC RIGHT SHOULDER PAIN: ICD-10-CM

## 2024-07-25 DIAGNOSIS — G89.29 CHRONIC RIGHT SHOULDER PAIN: ICD-10-CM

## 2024-07-25 PROCEDURE — 73221 MRI JOINT UPR EXTREM W/O DYE: CPT

## 2024-09-10 ENCOUNTER — OFFICE VISIT (OUTPATIENT)
Dept: PRIMARY CARE CLINIC | Age: 64
End: 2024-09-10
Payer: COMMERCIAL

## 2024-09-10 VITALS
TEMPERATURE: 97.6 F | SYSTOLIC BLOOD PRESSURE: 116 MMHG | RESPIRATION RATE: 16 BRPM | HEART RATE: 93 BPM | OXYGEN SATURATION: 98 % | BODY MASS INDEX: 22.68 KG/M2 | WEIGHT: 124 LBS | DIASTOLIC BLOOD PRESSURE: 64 MMHG

## 2024-09-10 DIAGNOSIS — G89.29 CHRONIC RIGHT SHOULDER PAIN: ICD-10-CM

## 2024-09-10 DIAGNOSIS — M25.511 CHRONIC RIGHT SHOULDER PAIN: ICD-10-CM

## 2024-09-10 DIAGNOSIS — G47.33 OSA (OBSTRUCTIVE SLEEP APNEA): ICD-10-CM

## 2024-09-10 DIAGNOSIS — E03.9 ACQUIRED HYPOTHYROIDISM: ICD-10-CM

## 2024-09-10 DIAGNOSIS — Z01.818 PREOP EXAMINATION: ICD-10-CM

## 2024-09-10 DIAGNOSIS — Z98.1 S/P LUMBAR SPINAL FUSION: ICD-10-CM

## 2024-09-10 DIAGNOSIS — J45.20 MILD INTERMITTENT ASTHMA WITHOUT COMPLICATION: ICD-10-CM

## 2024-09-10 DIAGNOSIS — M79.7 FIBROMYALGIA: ICD-10-CM

## 2024-09-10 DIAGNOSIS — M75.121 COMPLETE TEAR OF RIGHT ROTATOR CUFF, UNSPECIFIED WHETHER TRAUMATIC: Primary | ICD-10-CM

## 2024-09-10 LAB
ANION GAP SERPL CALCULATED.3IONS-SCNC: 12 MMOL/L (ref 3–16)
APTT BLD: 27.7 SEC (ref 22.1–36.4)
BUN SERPL-MCNC: 19 MG/DL (ref 7–20)
CALCIUM SERPL-MCNC: 9.4 MG/DL (ref 8.3–10.6)
CHLORIDE SERPL-SCNC: 101 MMOL/L (ref 99–110)
CO2 SERPL-SCNC: 28 MMOL/L (ref 21–32)
CREAT SERPL-MCNC: 0.9 MG/DL (ref 0.6–1.2)
GFR SERPLBLD CREATININE-BSD FMLA CKD-EPI: 72 ML/MIN/{1.73_M2}
GLUCOSE SERPL-MCNC: 89 MG/DL (ref 70–99)
INR PPP: 0.97 (ref 0.85–1.15)
POTASSIUM SERPL-SCNC: 4.6 MMOL/L (ref 3.5–5.1)
PROTHROMBIN TIME: 13 SEC (ref 11.9–14.9)
SODIUM SERPL-SCNC: 141 MMOL/L (ref 136–145)

## 2024-09-10 PROCEDURE — 99214 OFFICE O/P EST MOD 30 MIN: CPT | Performed by: NURSE PRACTITIONER

## 2024-09-10 ASSESSMENT — ENCOUNTER SYMPTOMS
SHORTNESS OF BREATH: 0
WHEEZING: 0
DIARRHEA: 0
NAUSEA: 0
VOMITING: 0
COUGH: 0

## 2024-09-11 LAB
BASOPHILS # BLD: 0.1 K/UL (ref 0–0.2)
BASOPHILS NFR BLD: 0.8 %
DEPRECATED RDW RBC AUTO: 13 % (ref 12.4–15.4)
EOSINOPHIL # BLD: 0.1 K/UL (ref 0–0.6)
EOSINOPHIL NFR BLD: 1.4 %
HCT VFR BLD AUTO: 42.8 % (ref 36–48)
HGB BLD-MCNC: 15 G/DL (ref 12–16)
LYMPHOCYTES # BLD: 1.8 K/UL (ref 1–5.1)
LYMPHOCYTES NFR BLD: 20.8 %
MCH RBC QN AUTO: 32 PG (ref 26–34)
MCHC RBC AUTO-ENTMCNC: 35 G/DL (ref 31–36)
MCV RBC AUTO: 91.5 FL (ref 80–100)
MONOCYTES # BLD: 0.4 K/UL (ref 0–1.3)
MONOCYTES NFR BLD: 5 %
NEUTROPHILS # BLD: 6.3 K/UL (ref 1.7–7.7)
NEUTROPHILS NFR BLD: 72 %
PLATELET # BLD AUTO: 246 K/UL (ref 135–450)
PMV BLD AUTO: 9 FL (ref 5–10.5)
RBC # BLD AUTO: 4.68 M/UL (ref 4–5.2)
WBC # BLD AUTO: 8.7 K/UL (ref 4–11)

## 2025-01-29 ENCOUNTER — OFFICE VISIT (OUTPATIENT)
Dept: PRIMARY CARE CLINIC | Age: 65
End: 2025-01-29
Payer: COMMERCIAL

## 2025-01-29 VITALS
TEMPERATURE: 97.1 F | BODY MASS INDEX: 22.89 KG/M2 | WEIGHT: 125.13 LBS | DIASTOLIC BLOOD PRESSURE: 80 MMHG | HEART RATE: 85 BPM | OXYGEN SATURATION: 95 % | SYSTOLIC BLOOD PRESSURE: 108 MMHG

## 2025-01-29 DIAGNOSIS — E78.00 HYPERCHOLESTEREMIA: ICD-10-CM

## 2025-01-29 DIAGNOSIS — Z98.1 S/P LUMBAR SPINAL FUSION: ICD-10-CM

## 2025-01-29 DIAGNOSIS — Z12.31 ENCOUNTER FOR SCREENING MAMMOGRAM FOR MALIGNANT NEOPLASM OF BREAST: ICD-10-CM

## 2025-01-29 DIAGNOSIS — J45.20 MILD INTERMITTENT ASTHMA WITHOUT COMPLICATION: ICD-10-CM

## 2025-01-29 DIAGNOSIS — G47.33 OSA (OBSTRUCTIVE SLEEP APNEA): ICD-10-CM

## 2025-01-29 DIAGNOSIS — E03.9 ACQUIRED HYPOTHYROIDISM: ICD-10-CM

## 2025-01-29 DIAGNOSIS — Z00.00 ENCOUNTER FOR ANNUAL PHYSICAL EXAM: Primary | ICD-10-CM

## 2025-01-29 DIAGNOSIS — M79.7 FIBROMYALGIA: ICD-10-CM

## 2025-01-29 DIAGNOSIS — M51.379 DEGENERATION OF INTERVERTEBRAL DISC OF LUMBOSACRAL REGION, UNSPECIFIED WHETHER PAIN PRESENT: ICD-10-CM

## 2025-01-29 PROCEDURE — 99396 PREV VISIT EST AGE 40-64: CPT | Performed by: STUDENT IN AN ORGANIZED HEALTH CARE EDUCATION/TRAINING PROGRAM

## 2025-01-29 RX ORDER — LEVOTHYROXINE SODIUM 50 UG/1
50 TABLET ORAL DAILY
Qty: 90 TABLET | Refills: 3 | Status: SHIPPED | OUTPATIENT
Start: 2025-01-29

## 2025-01-29 RX ORDER — GABAPENTIN 300 MG/1
CAPSULE ORAL
Qty: 540 CAPSULE | Refills: 3 | Status: SHIPPED | OUTPATIENT
Start: 2025-01-29 | End: 2026-01-29

## 2025-01-29 RX ORDER — CETIRIZINE HYDROCHLORIDE 10 MG/1
10 TABLET ORAL DAILY
Qty: 90 TABLET | Refills: 1 | Status: SHIPPED | OUTPATIENT
Start: 2025-01-29

## 2025-01-29 RX ORDER — MONTELUKAST SODIUM 10 MG/1
10 TABLET ORAL NIGHTLY
Qty: 90 TABLET | Refills: 3 | Status: SHIPPED | OUTPATIENT
Start: 2025-01-29

## 2025-01-29 SDOH — ECONOMIC STABILITY: FOOD INSECURITY: WITHIN THE PAST 12 MONTHS, THE FOOD YOU BOUGHT JUST DIDN'T LAST AND YOU DIDN'T HAVE MONEY TO GET MORE.: NEVER TRUE

## 2025-01-29 SDOH — ECONOMIC STABILITY: FOOD INSECURITY: WITHIN THE PAST 12 MONTHS, YOU WORRIED THAT YOUR FOOD WOULD RUN OUT BEFORE YOU GOT MONEY TO BUY MORE.: NEVER TRUE

## 2025-01-29 ASSESSMENT — PATIENT HEALTH QUESTIONNAIRE - PHQ9
SUM OF ALL RESPONSES TO PHQ QUESTIONS 1-9: 0
10. IF YOU CHECKED OFF ANY PROBLEMS, HOW DIFFICULT HAVE THESE PROBLEMS MADE IT FOR YOU TO DO YOUR WORK, TAKE CARE OF THINGS AT HOME, OR GET ALONG WITH OTHER PEOPLE: NOT DIFFICULT AT ALL
5. POOR APPETITE OR OVEREATING: NOT AT ALL
4. FEELING TIRED OR HAVING LITTLE ENERGY: NOT AT ALL
SUM OF ALL RESPONSES TO PHQ QUESTIONS 1-9: 0
6. FEELING BAD ABOUT YOURSELF - OR THAT YOU ARE A FAILURE OR HAVE LET YOURSELF OR YOUR FAMILY DOWN: NOT AT ALL
SUM OF ALL RESPONSES TO PHQ QUESTIONS 1-9: 0
7. TROUBLE CONCENTRATING ON THINGS, SUCH AS READING THE NEWSPAPER OR WATCHING TELEVISION: NOT AT ALL
3. TROUBLE FALLING OR STAYING ASLEEP: NOT AT ALL
8. MOVING OR SPEAKING SO SLOWLY THAT OTHER PEOPLE COULD HAVE NOTICED. OR THE OPPOSITE, BEING SO FIGETY OR RESTLESS THAT YOU HAVE BEEN MOVING AROUND A LOT MORE THAN USUAL: NOT AT ALL
SUM OF ALL RESPONSES TO PHQ9 QUESTIONS 1 & 2: 0
1. LITTLE INTEREST OR PLEASURE IN DOING THINGS: NOT AT ALL
2. FEELING DOWN, DEPRESSED OR HOPELESS: NOT AT ALL
9. THOUGHTS THAT YOU WOULD BE BETTER OFF DEAD, OR OF HURTING YOURSELF: NOT AT ALL
SUM OF ALL RESPONSES TO PHQ QUESTIONS 1-9: 0

## 2025-01-29 NOTE — ASSESSMENT & PLAN NOTE
Chronic well controlled  Continue current regimen      Orders:    Lipid Panel; Future    Comprehensive Metabolic Panel; Future    CBC with Auto Differential; Future    Urinalysis with Reflex to Culture; Future    TSH reflex to FT4; Future

## 2025-01-29 NOTE — PROGRESS NOTES
2025    Oneyda Styles (:  1960) is a 64 y.o. female, here for a preventive medicine evaluation.    Subjective   Patient Active Problem List   Diagnosis    Hypothyroid    Fibromyalgia    Irritable bowel syndrome    Biceps tendonitis    Lumbar radicular pain    Asthma    Hypercholesteremia    CHAR[place]OTTE (obstructive sleep apnea)    Degeneration of lumbar or lumbosacral intervertebral disc    S/P lumbar spinal fusion    Chronic pain of both hips     Right shoulder pain still but has improved somewhat in PT    Exercise: limited due to pain  Diet: ok    She is always constipated and takes stool softener and it helps     Mood: good     Review of Systems   All other systems reviewed and are negative.      Prior to Visit Medications    Medication Sig Taking? Authorizing Provider   VITAMIN D PO Take by mouth Yes Provider, MD Levar   levothyroxine (SYNTHROID) 50 MCG tablet TAKE 1 TABLET BY MOUTH DAILY Yes Josefina Dunbar MD   gabapentin (NEURONTIN) 300 MG capsule TAKE 2 CAPSULES BY MOUTH 3 TIMES DAILY Yes Josefina Dunbar MD   montelukast (SINGULAIR) 10 MG tablet TAKE 1 TABLET BY MOUTH NIGHTLY Yes Josefina Dunbar MD   cetirizine (ZYRTEC) 10 MG tablet Take 1 tablet by mouth daily Yes Josefina Dunbar MD   albuterol sulfate HFA (PROVENTIL;VENTOLIN;PROAIR) 108 (90 Base) MCG/ACT inhaler Inhale 2 puffs into the lungs every 6 hours as needed for Wheezing Yes Marvin Smiley MD   beclomethasone (QVAR) 40 MCG/ACT inhaler Inhale 2 puffs into the lungs 2 times daily Yes Marvin Smiley MD   naproxen (NAPROSYN) 250 MG tablet Take 1 tablet by mouth 2 times daily (with meals)  Patient not taking: Reported on 2025  Josefina Dunbar MD   beclomethasone (QVAR REDIHALER) 40 MCG/ACT AERB inhaler Inhale 2 puffs into the lungs in the morning and 2 puffs in the evening.  Patient not taking: Reported on 2025  Josefina Dunbar MD        Allergies   Allergen Reactions    Percocet [Oxycodone-Acetaminophen] Shortness Of

## 2025-01-29 NOTE — ASSESSMENT & PLAN NOTE
Orders:    Lipid Panel; Future    Comprehensive Metabolic Panel; Future    CBC with Auto Differential; Future    Urinalysis with Reflex to Culture; Future    TSH reflex to FT4; Future

## 2025-02-20 DIAGNOSIS — J45.20 MILD INTERMITTENT ASTHMA WITHOUT COMPLICATION: ICD-10-CM

## 2025-02-20 DIAGNOSIS — Z98.1 S/P LUMBAR SPINAL FUSION: ICD-10-CM

## 2025-02-20 DIAGNOSIS — E78.00 HYPERCHOLESTEREMIA: ICD-10-CM

## 2025-02-20 DIAGNOSIS — M79.7 FIBROMYALGIA: ICD-10-CM

## 2025-02-20 DIAGNOSIS — G47.33 OSA (OBSTRUCTIVE SLEEP APNEA): ICD-10-CM

## 2025-02-20 DIAGNOSIS — Z00.00 ENCOUNTER FOR ANNUAL PHYSICAL EXAM: ICD-10-CM

## 2025-02-20 DIAGNOSIS — E03.9 ACQUIRED HYPOTHYROIDISM: ICD-10-CM

## 2025-02-20 DIAGNOSIS — M51.379 DEGENERATION OF INTERVERTEBRAL DISC OF LUMBOSACRAL REGION, UNSPECIFIED WHETHER PAIN PRESENT: ICD-10-CM

## 2025-02-21 LAB
ALBUMIN SERPL-MCNC: 4.4 G/DL (ref 3.4–5)
ALBUMIN/GLOB SERPL: 2.1 {RATIO} (ref 1.1–2.2)
ALP SERPL-CCNC: 103 U/L (ref 40–129)
ALT SERPL-CCNC: 16 U/L (ref 10–40)
ANION GAP SERPL CALCULATED.3IONS-SCNC: 12 MMOL/L (ref 3–16)
AST SERPL-CCNC: 22 U/L (ref 15–37)
BASOPHILS # BLD: 0 K/UL (ref 0–0.2)
BASOPHILS NFR BLD: 0.8 %
BILIRUB SERPL-MCNC: 0.4 MG/DL (ref 0–1)
BILIRUB UR QL STRIP.AUTO: NEGATIVE
BUN SERPL-MCNC: 18 MG/DL (ref 7–20)
CALCIUM SERPL-MCNC: 9.6 MG/DL (ref 8.3–10.6)
CHLORIDE SERPL-SCNC: 100 MMOL/L (ref 99–110)
CHOLEST SERPL-MCNC: 272 MG/DL (ref 0–199)
CLARITY UR: CLEAR
CO2 SERPL-SCNC: 28 MMOL/L (ref 21–32)
COLOR UR: YELLOW
CREAT SERPL-MCNC: 0.8 MG/DL (ref 0.6–1.2)
DEPRECATED RDW RBC AUTO: 12.8 % (ref 12.4–15.4)
EOSINOPHIL # BLD: 0.1 K/UL (ref 0–0.6)
EOSINOPHIL NFR BLD: 2.4 %
GFR SERPLBLD CREATININE-BSD FMLA CKD-EPI: 82 ML/MIN/{1.73_M2}
GLUCOSE SERPL-MCNC: 84 MG/DL (ref 70–99)
GLUCOSE UR STRIP.AUTO-MCNC: NEGATIVE MG/DL
HCT VFR BLD AUTO: 43.1 % (ref 36–48)
HDLC SERPL-MCNC: 69 MG/DL (ref 40–60)
HGB BLD-MCNC: 14.7 G/DL (ref 12–16)
HGB UR QL STRIP.AUTO: NEGATIVE
KETONES UR STRIP.AUTO-MCNC: NEGATIVE MG/DL
LDLC SERPL CALC-MCNC: 183 MG/DL
LEUKOCYTE ESTERASE UR QL STRIP.AUTO: NEGATIVE
LYMPHOCYTES # BLD: 1.7 K/UL (ref 1–5.1)
LYMPHOCYTES NFR BLD: 31.4 %
MCH RBC QN AUTO: 32.6 PG (ref 26–34)
MCHC RBC AUTO-ENTMCNC: 34.1 G/DL (ref 31–36)
MCV RBC AUTO: 95.7 FL (ref 80–100)
MONOCYTES # BLD: 0.4 K/UL (ref 0–1.3)
MONOCYTES NFR BLD: 6.6 %
NEUTROPHILS # BLD: 3.2 K/UL (ref 1.7–7.7)
NEUTROPHILS NFR BLD: 58.8 %
NITRITE UR QL STRIP.AUTO: NEGATIVE
PH UR STRIP.AUTO: 6.5 [PH] (ref 5–8)
PLATELET # BLD AUTO: 246 K/UL (ref 135–450)
PMV BLD AUTO: 8.8 FL (ref 5–10.5)
POTASSIUM SERPL-SCNC: 4 MMOL/L (ref 3.5–5.1)
PROT SERPL-MCNC: 6.5 G/DL (ref 6.4–8.2)
PROT UR STRIP.AUTO-MCNC: NEGATIVE MG/DL
RBC # BLD AUTO: 4.51 M/UL (ref 4–5.2)
SODIUM SERPL-SCNC: 140 MMOL/L (ref 136–145)
SP GR UR STRIP.AUTO: 1.01 (ref 1–1.03)
TRIGL SERPL-MCNC: 99 MG/DL (ref 0–150)
TSH SERPL DL<=0.005 MIU/L-ACNC: 1.04 UIU/ML (ref 0.27–4.2)
UA COMPLETE W REFLEX CULTURE PNL UR: NORMAL
UA DIPSTICK W REFLEX MICRO PNL UR: NORMAL
URN SPEC COLLECT METH UR: NORMAL
UROBILINOGEN UR STRIP-ACNC: 0.2 E.U./DL
VLDLC SERPL CALC-MCNC: 20 MG/DL
WBC # BLD AUTO: 5.5 K/UL (ref 4–11)

## 2025-05-07 ENCOUNTER — RESULTS FOLLOW-UP (OUTPATIENT)
Dept: PRIMARY CARE CLINIC | Age: 65
End: 2025-05-07

## 2025-07-21 ENCOUNTER — OFFICE VISIT (OUTPATIENT)
Dept: PRIMARY CARE CLINIC | Age: 65
End: 2025-07-21
Payer: COMMERCIAL

## 2025-07-21 ENCOUNTER — PATIENT MESSAGE (OUTPATIENT)
Dept: PRIMARY CARE CLINIC | Age: 65
End: 2025-07-21

## 2025-07-21 VITALS
DIASTOLIC BLOOD PRESSURE: 65 MMHG | SYSTOLIC BLOOD PRESSURE: 112 MMHG | HEIGHT: 63 IN | OXYGEN SATURATION: 97 % | BODY MASS INDEX: 22.5 KG/M2 | WEIGHT: 127 LBS | HEART RATE: 99 BPM

## 2025-07-21 DIAGNOSIS — M79.7 FIBROMYALGIA: Primary | ICD-10-CM

## 2025-07-21 DIAGNOSIS — Z98.1 S/P LUMBAR SPINAL FUSION: ICD-10-CM

## 2025-07-21 DIAGNOSIS — M54.16 LUMBAR RADICULAR PAIN: ICD-10-CM

## 2025-07-21 DIAGNOSIS — M25.551 CHRONIC PAIN OF BOTH HIPS: ICD-10-CM

## 2025-07-21 DIAGNOSIS — G89.29 CHRONIC PAIN OF BOTH HIPS: ICD-10-CM

## 2025-07-21 DIAGNOSIS — M25.552 CHRONIC PAIN OF BOTH HIPS: ICD-10-CM

## 2025-07-21 PROCEDURE — 99213 OFFICE O/P EST LOW 20 MIN: CPT | Performed by: STUDENT IN AN ORGANIZED HEALTH CARE EDUCATION/TRAINING PROGRAM

## 2025-07-21 NOTE — ASSESSMENT & PLAN NOTE
Chronic pain needs accommodations and needs to be able to work from home  Forms filled out today and copies made and uploaded to her chart  A copy was also given to patient before she left

## 2025-07-21 NOTE — PROGRESS NOTES
Oneyda Styles (:  1960) is a 65 y.o. female,Established patient, here for evaluation of the following chief complaint(s):  Follow-up         Assessment & Plan  Fibromyalgia     Chronic pain needs accommodations and needs to be able to work from home  Forms filled out today and copies made and uploaded to her chart  A copy was also given to patient before she left       Chronic pain of both hips            Lumbar radicular pain            S/P lumbar spinal fusion              Return if symptoms worsen or fail to improve.       Subjective   HPI  Patient with history of fibromyalgia and chronic pain bilateral hips and back, she has been working from home.  She requires constant moving such as unable to sit for prolonged periods of time or stand for prolonged periods of time needs to change position frequently  Needs to be able to remove her shoes and wear comfortable clothing as to not exacerbate her pain    Review of Systems   All other systems reviewed and are negative.         Objective   Physical Exam  Vitals reviewed.   Constitutional:       General: She is not in acute distress.     Appearance: Normal appearance. She is not ill-appearing.   HENT:      Head: Normocephalic and atraumatic.      Right Ear: External ear normal.      Left Ear: External ear normal.   Eyes:      General: No scleral icterus.        Right eye: No discharge.         Left eye: No discharge.      Extraocular Movements: Extraocular movements intact.      Conjunctiva/sclera: Conjunctivae normal.   Pulmonary:      Effort: Pulmonary effort is normal. No respiratory distress.   Musculoskeletal:      Cervical back: Neck supple.   Skin:     Coloration: Skin is not jaundiced.      Findings: No lesion or rash.   Neurological:      Mental Status: She is alert.      Cranial Nerves: No cranial nerve deficit.      Coordination: Coordination normal.   Psychiatric:         Mood and Affect: Mood normal.                  An electronic